# Patient Record
Sex: MALE | Race: WHITE | NOT HISPANIC OR LATINO
[De-identification: names, ages, dates, MRNs, and addresses within clinical notes are randomized per-mention and may not be internally consistent; named-entity substitution may affect disease eponyms.]

---

## 2017-07-17 PROBLEM — Z00.00 ENCOUNTER FOR PREVENTIVE HEALTH EXAMINATION: Status: ACTIVE | Noted: 2017-07-17

## 2018-12-02 ENCOUNTER — TRANSCRIPTION ENCOUNTER (OUTPATIENT)
Age: 78
End: 2018-12-02

## 2018-12-07 ENCOUNTER — TRANSCRIPTION ENCOUNTER (OUTPATIENT)
Age: 78
End: 2018-12-07

## 2019-04-02 ENCOUNTER — RX RENEWAL (OUTPATIENT)
Age: 79
End: 2019-04-02

## 2019-06-13 ENCOUNTER — OUTPATIENT (OUTPATIENT)
Dept: OUTPATIENT SERVICES | Facility: HOSPITAL | Age: 79
LOS: 1 days | Discharge: HOME | End: 2019-06-13
Payer: COMMERCIAL

## 2019-06-13 VITALS
OXYGEN SATURATION: 97 % | DIASTOLIC BLOOD PRESSURE: 80 MMHG | HEIGHT: 62 IN | RESPIRATION RATE: 17 BRPM | WEIGHT: 228.18 LBS | TEMPERATURE: 98 F | SYSTOLIC BLOOD PRESSURE: 130 MMHG | HEART RATE: 76 BPM

## 2019-06-13 DIAGNOSIS — Z01.818 ENCOUNTER FOR OTHER PREPROCEDURAL EXAMINATION: ICD-10-CM

## 2019-06-13 DIAGNOSIS — Z90.49 ACQUIRED ABSENCE OF OTHER SPECIFIED PARTS OF DIGESTIVE TRACT: Chronic | ICD-10-CM

## 2019-06-13 DIAGNOSIS — G47.30 SLEEP APNEA, UNSPECIFIED: ICD-10-CM

## 2019-06-13 DIAGNOSIS — I10 ESSENTIAL (PRIMARY) HYPERTENSION: ICD-10-CM

## 2019-06-13 DIAGNOSIS — M16.11 UNILATERAL PRIMARY OSTEOARTHRITIS, RIGHT HIP: ICD-10-CM

## 2019-06-13 DIAGNOSIS — Z96.642 PRESENCE OF LEFT ARTIFICIAL HIP JOINT: Chronic | ICD-10-CM

## 2019-06-13 LAB
ANION GAP SERPL CALC-SCNC: 7 MMOL/L — SIGNIFICANT CHANGE UP (ref 5–17)
APTT BLD: 28.4 SEC — SIGNIFICANT CHANGE UP (ref 27.5–36.3)
BASOPHILS # BLD AUTO: 0.06 K/UL — SIGNIFICANT CHANGE UP (ref 0–0.2)
BASOPHILS NFR BLD AUTO: 0.7 % — SIGNIFICANT CHANGE UP (ref 0–2)
BLD GP AB SCN SERPL QL: SIGNIFICANT CHANGE UP
BUN SERPL-MCNC: 19 MG/DL — SIGNIFICANT CHANGE UP (ref 7–23)
CALCIUM SERPL-MCNC: 9.4 MG/DL — SIGNIFICANT CHANGE UP (ref 8.5–10.1)
CHLORIDE SERPL-SCNC: 101 MMOL/L — SIGNIFICANT CHANGE UP (ref 96–108)
CO2 SERPL-SCNC: 31 MMOL/L — SIGNIFICANT CHANGE UP (ref 22–31)
CREAT SERPL-MCNC: 0.91 MG/DL — SIGNIFICANT CHANGE UP (ref 0.5–1.3)
EOSINOPHIL # BLD AUTO: 0.2 K/UL — SIGNIFICANT CHANGE UP (ref 0–0.5)
EOSINOPHIL NFR BLD AUTO: 2.5 % — SIGNIFICANT CHANGE UP (ref 0–6)
GLUCOSE SERPL-MCNC: 127 MG/DL — HIGH (ref 70–99)
HBA1C BLD-MCNC: 6.4 % — HIGH (ref 4–5.6)
HCT VFR BLD CALC: 44.5 % — SIGNIFICANT CHANGE UP (ref 39–50)
HGB BLD-MCNC: 14.1 G/DL — SIGNIFICANT CHANGE UP (ref 13–17)
IMM GRANULOCYTES NFR BLD AUTO: 0.4 % — SIGNIFICANT CHANGE UP (ref 0–1.5)
INR BLD: 1.03 RATIO — SIGNIFICANT CHANGE UP (ref 0.88–1.16)
LYMPHOCYTES # BLD AUTO: 1.46 K/UL — SIGNIFICANT CHANGE UP (ref 1–3.3)
LYMPHOCYTES # BLD AUTO: 18 % — SIGNIFICANT CHANGE UP (ref 13–44)
MCHC RBC-ENTMCNC: 26.2 PG — LOW (ref 27–34)
MCHC RBC-ENTMCNC: 31.7 GM/DL — LOW (ref 32–36)
MCV RBC AUTO: 82.7 FL — SIGNIFICANT CHANGE UP (ref 80–100)
MONOCYTES # BLD AUTO: 0.64 K/UL — SIGNIFICANT CHANGE UP (ref 0–0.9)
MONOCYTES NFR BLD AUTO: 7.9 % — SIGNIFICANT CHANGE UP (ref 2–14)
NEUTROPHILS # BLD AUTO: 5.73 K/UL — SIGNIFICANT CHANGE UP (ref 1.8–7.4)
NEUTROPHILS NFR BLD AUTO: 70.5 % — SIGNIFICANT CHANGE UP (ref 43–77)
NRBC # BLD: 0 /100 WBCS — SIGNIFICANT CHANGE UP (ref 0–0)
PLATELET # BLD AUTO: 277 K/UL — SIGNIFICANT CHANGE UP (ref 150–400)
POTASSIUM SERPL-MCNC: 4 MMOL/L — SIGNIFICANT CHANGE UP (ref 3.5–5.3)
POTASSIUM SERPL-SCNC: 4 MMOL/L — SIGNIFICANT CHANGE UP (ref 3.5–5.3)
PROTHROM AB SERPL-ACNC: 11.6 SEC — SIGNIFICANT CHANGE UP (ref 10–12.9)
RBC # BLD: 5.38 M/UL — SIGNIFICANT CHANGE UP (ref 4.2–5.8)
RBC # FLD: 15 % — HIGH (ref 10.3–14.5)
SODIUM SERPL-SCNC: 139 MMOL/L — SIGNIFICANT CHANGE UP (ref 135–145)
WBC # BLD: 8.12 K/UL — SIGNIFICANT CHANGE UP (ref 3.8–10.5)
WBC # FLD AUTO: 8.12 K/UL — SIGNIFICANT CHANGE UP (ref 3.8–10.5)

## 2019-06-13 PROCEDURE — 93010 ELECTROCARDIOGRAM REPORT: CPT

## 2019-06-13 RX ORDER — SODIUM CHLORIDE 9 MG/ML
3 INJECTION INTRAMUSCULAR; INTRAVENOUS; SUBCUTANEOUS EVERY 8 HOURS
Refills: 0 | Status: DISCONTINUED | OUTPATIENT
Start: 2019-06-26 | End: 2019-06-28

## 2019-06-13 NOTE — PHYSICAL THERAPY INITIAL EVALUATION ADULT - GAIT DEVIATIONS NOTED, PT EVAL
increased time in double stance/decreased step length/decreased catarina/decreased velocity of limb motion/decreased stride length/decreased weight-shifting ability

## 2019-06-13 NOTE — H&P PST ADULT - HISTORY OF PRESENT ILLNESS
79M pmh --- c/o right hip pain 2/2 unilateral primary osteoarthritis here for PST for scheduled Right total hip arthroplasty 79M pmh bladder Cancer (treated with sx), sleep apnea (on CPAP), achalasia, htn c/o right hip pain 2/2 unilateral primary osteoarthritis here for PST for scheduled Right total hip arthroplasty

## 2019-06-13 NOTE — H&P PST ADULT - NSICDXPROBLEM_GEN_ALL_CORE_FT
PROBLEM DIAGNOSES  Problem: Unilateral primary osteoarthritis, right hip  Assessment and Plan:   Right total hip arthroplasty  Pre-op instructions given by PA, patient verbalized understanding  Chlorhexidine wash instructions given   Pending: Medical Clearance    Problem: Sleep apnea  Assessment and Plan:   Using CPAP    Problem: HTN (hypertension)  Assessment and Plan:   Continue current regimen and medications.

## 2019-06-13 NOTE — PHYSICAL THERAPY INITIAL EVALUATION ADULT - CRITERIA FOR SKILLED THERAPEUTIC INTERVENTIONS
therapy frequency/risk reduction/prevention/anticipated discharge recommendation/rehab potential/impairments found/functional limitations in following categories/anticipated equipment needs at discharge

## 2019-06-13 NOTE — PHYSICAL THERAPY INITIAL EVALUATION ADULT - TINETTI BALANCE TEST, REHAB EVAL
Sitting Balance -1 /1 , Rises From Chair -1 /2, Attempts to rise - 2/2 , Immediate Standing Balance - 1/2,  Standing Balance - 1/2, Nudged - 2 /2, Eyes Closed -1 /1,  Turning 360 Deg -  1/2, Sitting down - 1/2, Balance Score -11 /16

## 2019-06-13 NOTE — H&P PST ADULT - ASSESSMENT
79M pmh bladder Cancer (treated with sx), sleep apnea (on CPAP), achalasia, htn c/o right hip pain 2/2 unilateral primary osteoarthritis here for PST for scheduled Right total hip arthroplasty  CAPRINI SCORE    AGE RELATED RISK FACTORS                                                       MOBILITY RELATED FACTORS  [ ] Age 41-60 years                                            (1 Point)                  [ ] Bed rest                                                        (1 Point)  [ ] Age: 61-74 years                                           (2 Points)                [ ] Plaster cast                                                   (2 Points)  [ ] Age= 75 years                                              (3 Points)                 [ ] Bed bound for more than 72 hours                   (2 Points)    DISEASE RELATED RISK FACTORS                                               GENDER SPECIFIC FACTORS  [ ] Edema in the lower extremities                       (1 Point)                  [ ] Pregnancy                                                     (1 Point)  [ ] Varicose veins                                               (1 Point)                  [ ] Post-partum < 6 weeks                                   (1 Point)             [ ] BMI > 25 Kg/m2                                            (1 Point)                  [ ] Hormonal therapy  or oral contraception            (1 Point)                 [ ] Sepsis (in the previous month)                        (1 Point)                  [ ] History of pregnancy complications  [ ] Pneumonia or serious lung disease                                               [ ] Unexplained or recurrent                       (1 Point)           (in the previous month)                               (1 Point)  [ ] Abnormal pulmonary function test                     (1 Point)                 SURGERY RELATED RISK FACTORS  [ ] Acute myocardial infarction                              (1 Point)                 [ ]  Section                                            (1 Point)  [ ] Congestive heart failure (in the previous month)  (1 Point)                 [ ] Minor surgery                                                 (1 Point)   [ ] Inflammatory bowel disease                             (1 Point)                 [ ] Arthroscopic surgery                                        (2 Points)  [ ] Central venous access                                    (2 Points)                [ ] General surgery lasting more than 45 minutes   (2 Points)       [ ] Stroke (in the previous month)                          (5 Points)               [ ] Elective arthroplasty                                        (5 Points)                                                                                                                                               HEMATOLOGY RELATED FACTORS                                                 TRAUMA RELATED RISK FACTORS  [ ] Prior episodes of VTE                                     (3 Points)                 [ ] Fracture of the hip, pelvis, or leg                       (5 Points)  [ ] Positive family history for VTE                         (3 Points)                 [ ] Acute spinal cord injury (in the previous month)  (5 Points)  [ ] Prothrombin 11204 A                                      (3 Points)                 [ ] Paralysis  (less than 1 month)                          (5 Points)  [ ] Factor V Leiden                                             (3 Points)                 [ ] Multiple Trauma within 1 month                         (5 Points)  [ ] Lupus anticoagulants                                     (3 Points)                                                           [ ] Anticardiolipin antibodies                                (3 Points)                                                       [ ] High homocysteine in the blood                      (3 Points)                                             [ ] Other congenital or acquired thrombophilia       (3 Points)                                                [ ] Heparin induced thrombocytopenia                  (3 Points)                                          Total Score [          ] 79M pmh bladder Cancer (treated with sx), sleep apnea (on CPAP), achalasia, htn c/o right hip pain 2/2 unilateral primary osteoarthritis here for PST for scheduled Right total hip arthroplasty  CAPRINI SCORE    AGE RELATED RISK FACTORS                                                       MOBILITY RELATED FACTORS  [ ] Age 41-60 years                                            (1 Point)                  [ ] Bed rest                                                        (1 Point)  [ ] Age: 61-74 years                                           (2 Points)                [ ] Plaster cast                                                   (2 Points)  [x ] Age= 75 years                                              (3 Points)                 [ ] Bed bound for more than 72 hours                   (2 Points)    DISEASE RELATED RISK FACTORS                                               GENDER SPECIFIC FACTORS  [ x] Edema in the lower extremities                       (1 Point)                  [ ] Pregnancy                                                     (1 Point)  [ ] Varicose veins                                               (1 Point)                  [ ] Post-partum < 6 weeks                                   (1 Point)             [x ] BMI > 25 Kg/m2                                            (1 Point)                  [ ] Hormonal therapy  or oral contraception            (1 Point)                 [ ] Sepsis (in the previous month)                        (1 Point)                  [ ] History of pregnancy complications  [ ] Pneumonia or serious lung disease                                               [ ] Unexplained or recurrent                       (1 Point)           (in the previous month)                               (1 Point)  [ ] Abnormal pulmonary function test                     (1 Point)                 SURGERY RELATED RISK FACTORS  [ ] Acute myocardial infarction                              (1 Point)                 [ ]  Section                                            (1 Point)  [ ] Congestive heart failure (in the previous month)  (1 Point)                 [ ] Minor surgery                                                 (1 Point)   [ ] Inflammatory bowel disease                             (1 Point)                 [ ] Arthroscopic surgery                                        (2 Points)  [ ] Central venous access                                    (2 Points)                [ ] General surgery lasting more than 45 minutes   (2 Points)       [ ] Stroke (in the previous month)                          (5 Points)               [ x] Elective arthroplasty                                        (5 Points)                                                                                                                                               HEMATOLOGY RELATED FACTORS                                                 TRAUMA RELATED RISK FACTORS  [ ] Prior episodes of VTE                                     (3 Points)                 [ ] Fracture of the hip, pelvis, or leg                       (5 Points)  [ ] Positive family history for VTE                         (3 Points)                 [ ] Acute spinal cord injury (in the previous month)  (5 Points)  [ ] Prothrombin 20942 A                                      (3 Points)                 [ ] Paralysis  (less than 1 month)                          (5 Points)  [ ] Factor V Leiden                                             (3 Points)                 [ ] Multiple Trauma within 1 month                         (5 Points)  [ ] Lupus anticoagulants                                     (3 Points)                                                           [ ] Anticardiolipin antibodies                                (3 Points)                                                       [ ] High homocysteine in the blood                      (3 Points)                                             [ ] Other congenital or acquired thrombophilia       (3 Points)                                                [ ] Heparin induced thrombocytopenia                  (3 Points)                                          Total Score [   10       ]

## 2019-06-13 NOTE — PHYSICAL THERAPY INITIAL EVALUATION ADULT - IMPAIRMENTS FOUND, PT EVAL
posture/aerobic capacity/endurance/gait, locomotion, and balance/muscle strength/ergonomics and body mechanics

## 2019-06-13 NOTE — H&P PST ADULT - NSICDXPASTMEDICALHX_GEN_ALL_CORE_FT
PAST MEDICAL HISTORY:  Achalasia     Bladder cancer treated surgically    GERD (gastroesophageal reflux disease)     History of restless legs syndrome     HTN (hypertension)     S/P right cataract extraction     Sleep apnea on CPAP

## 2019-06-13 NOTE — PHYSICAL THERAPY INITIAL EVALUATION ADULT - TINETTI GAIT TEST, REHAB EVAL
Indication of gait -0/1,   Step Length and height -1/2,   Foot Clearance -2/2,   Step Symmetry -0 /1,  Step Continuity -0/1,   Path -1/ 2,   Trunk -1/2,   Walking Time -1/1,   Total Score -6 /12

## 2019-06-13 NOTE — PHYSICAL THERAPY INITIAL EVALUATION ADULT - ADDITIONAL COMMENTS
Pt lives alone in an apt c 2 low steps, sarita rails,  far apart and unable to be reached simultaneously, at the entry. There is no stairs to negotiate in the apt unit.  Pt has a tub/shower  Pt has a tub/shower combo and regular toilet seat in BR. Pt is R handed and drives., fixed shower head, and regular toilet seat in BR. Pt is L handed and drives. R hip pain increases with activities and usually is 8/10. Pt takes over the counter pain meds as needed.  Pt's sonJulio is near by and  will be available to assist pt in post -op care upon discharge home. Pt has a straight cane and it's in good condition.

## 2019-06-14 LAB
MRSA PCR RESULT.: SIGNIFICANT CHANGE UP
S AUREUS DNA NOSE QL NAA+PROBE: SIGNIFICANT CHANGE UP

## 2019-06-17 ENCOUNTER — APPOINTMENT (OUTPATIENT)
Dept: INTERNAL MEDICINE | Facility: CLINIC | Age: 79
End: 2019-06-17
Payer: MEDICARE

## 2019-06-17 VITALS
RESPIRATION RATE: 16 BRPM | WEIGHT: 220 LBS | SYSTOLIC BLOOD PRESSURE: 130 MMHG | DIASTOLIC BLOOD PRESSURE: 70 MMHG | BODY MASS INDEX: 40.48 KG/M2 | HEIGHT: 62 IN

## 2019-06-17 DIAGNOSIS — R76.12 NONSPECIFIC REACTION TO CELL MEDIATED IMMUNITY MEASUREMENT OF GAMMA INTERFERON ANTIGEN RESPONSE W/OUT ACTIVE TUBERCULOSIS: ICD-10-CM

## 2019-06-17 DIAGNOSIS — K22.0 ACHALASIA OF CARDIA: ICD-10-CM

## 2019-06-17 PROCEDURE — 99214 OFFICE O/P EST MOD 30 MIN: CPT

## 2019-06-17 RX ORDER — PREGABALIN 150 MG/1
150 CAPSULE ORAL
Refills: 0 | Status: ACTIVE | COMMUNITY

## 2019-06-17 NOTE — PHYSICAL EXAM
[No Acute Distress] : no acute distress [Supple] : supple [No Respiratory Distress] : no respiratory distress  [Clear to Auscultation] : lungs were clear to auscultation bilaterally [Normal Rate] : normal rate  [Regular Rhythm] : with a regular rhythm [Normal S1, S2] : normal S1 and S2 [Non Tender] : non-tender [Soft] : abdomen soft [No Focal Deficits] : no focal deficits

## 2019-06-17 NOTE — HISTORY OF PRESENT ILLNESS
[No Pertinent Cardiac History] : no history of aortic stenosis, atrial fibrillation, coronary artery disease, recent myocardial infarction, or implantable device/pacemaker [Sleep Apnea] : sleep apnea [No Adverse Anesthesia Reaction] : no adverse anesthesia reaction in self or family member [Chronic Anticoagulation] : no chronic anticoagulation [Chronic Kidney Disease] : no chronic kidney disease [Diabetes] : no diabetes [(Patient denies any chest pain, claudication, dyspnea on exertion, orthopnea, palpitations or syncope)] : Patient denies any chest pain, claudication, dyspnea on exertion, orthopnea, palpitations or syncope [Moderate (4-6 METs)] : Moderate (4-6 METs) [FreeTextEntry1] : right total hip [FreeTextEntry2] : 6/26/19 [FreeTextEntry3] : deep [FreeTextEntry7] : cardiac w/u 2009

## 2019-06-25 ENCOUNTER — TRANSCRIPTION ENCOUNTER (OUTPATIENT)
Age: 79
End: 2019-06-25

## 2019-06-26 ENCOUNTER — TRANSCRIPTION ENCOUNTER (OUTPATIENT)
Age: 79
End: 2019-06-26

## 2019-06-26 ENCOUNTER — INPATIENT (INPATIENT)
Facility: HOSPITAL | Age: 79
LOS: 1 days | Discharge: HOME HEALTH | End: 2019-06-28
Attending: ORTHOPAEDIC SURGERY | Admitting: ORTHOPAEDIC SURGERY
Payer: COMMERCIAL

## 2019-06-26 ENCOUNTER — RESULT REVIEW (OUTPATIENT)
Age: 79
End: 2019-06-26

## 2019-06-26 VITALS
HEART RATE: 71 BPM | TEMPERATURE: 97 F | DIASTOLIC BLOOD PRESSURE: 73 MMHG | OXYGEN SATURATION: 95 % | WEIGHT: 227.08 LBS | RESPIRATION RATE: 20 BRPM | SYSTOLIC BLOOD PRESSURE: 161 MMHG | HEIGHT: 62 IN

## 2019-06-26 DIAGNOSIS — Z96.642 PRESENCE OF LEFT ARTIFICIAL HIP JOINT: Chronic | ICD-10-CM

## 2019-06-26 DIAGNOSIS — Z98.890 OTHER SPECIFIED POSTPROCEDURAL STATES: Chronic | ICD-10-CM

## 2019-06-26 DIAGNOSIS — Z90.49 ACQUIRED ABSENCE OF OTHER SPECIFIED PARTS OF DIGESTIVE TRACT: Chronic | ICD-10-CM

## 2019-06-26 LAB
ANION GAP SERPL CALC-SCNC: 6 MMOL/L — SIGNIFICANT CHANGE UP (ref 5–17)
BLD GP AB SCN SERPL QL: SIGNIFICANT CHANGE UP
BUN SERPL-MCNC: 21 MG/DL — SIGNIFICANT CHANGE UP (ref 7–23)
CALCIUM SERPL-MCNC: 8.2 MG/DL — LOW (ref 8.5–10.1)
CHLORIDE SERPL-SCNC: 107 MMOL/L — SIGNIFICANT CHANGE UP (ref 96–108)
CO2 SERPL-SCNC: 29 MMOL/L — SIGNIFICANT CHANGE UP (ref 22–31)
CREAT SERPL-MCNC: 0.95 MG/DL — SIGNIFICANT CHANGE UP (ref 0.5–1.3)
GLUCOSE SERPL-MCNC: 122 MG/DL — HIGH (ref 70–99)
HCT VFR BLD CALC: 38.4 % — LOW (ref 39–50)
HGB BLD-MCNC: 12.1 G/DL — LOW (ref 13–17)
MCHC RBC-ENTMCNC: 26.5 PG — LOW (ref 27–34)
MCHC RBC-ENTMCNC: 31.5 GM/DL — LOW (ref 32–36)
MCV RBC AUTO: 84 FL — SIGNIFICANT CHANGE UP (ref 80–100)
NRBC # BLD: 0 /100 WBCS — SIGNIFICANT CHANGE UP (ref 0–0)
PLATELET # BLD AUTO: 215 K/UL — SIGNIFICANT CHANGE UP (ref 150–400)
POTASSIUM SERPL-MCNC: 4.3 MMOL/L — SIGNIFICANT CHANGE UP (ref 3.5–5.3)
POTASSIUM SERPL-SCNC: 4.3 MMOL/L — SIGNIFICANT CHANGE UP (ref 3.5–5.3)
RBC # BLD: 4.57 M/UL — SIGNIFICANT CHANGE UP (ref 4.2–5.8)
RBC # FLD: 15.1 % — HIGH (ref 10.3–14.5)
SODIUM SERPL-SCNC: 142 MMOL/L — SIGNIFICANT CHANGE UP (ref 135–145)
WBC # BLD: 9.08 K/UL — SIGNIFICANT CHANGE UP (ref 3.8–10.5)
WBC # FLD AUTO: 9.08 K/UL — SIGNIFICANT CHANGE UP (ref 3.8–10.5)

## 2019-06-26 PROCEDURE — 88311 DECALCIFY TISSUE: CPT | Mod: 26

## 2019-06-26 PROCEDURE — 88305 TISSUE EXAM BY PATHOLOGIST: CPT | Mod: 26

## 2019-06-26 PROCEDURE — 72170 X-RAY EXAM OF PELVIS: CPT | Mod: 26

## 2019-06-26 RX ORDER — SODIUM CHLORIDE 9 MG/ML
1000 INJECTION, SOLUTION INTRAVENOUS
Refills: 0 | Status: DISCONTINUED | OUTPATIENT
Start: 2019-06-26 | End: 2019-06-26

## 2019-06-26 RX ORDER — ONDANSETRON 8 MG/1
4 TABLET, FILM COATED ORAL EVERY 6 HOURS
Refills: 0 | Status: DISCONTINUED | OUTPATIENT
Start: 2019-06-26 | End: 2019-06-28

## 2019-06-26 RX ORDER — ROPINIROLE 8 MG/1
2 TABLET, FILM COATED, EXTENDED RELEASE ORAL
Refills: 0 | Status: DISCONTINUED | OUTPATIENT
Start: 2019-06-26 | End: 2019-06-28

## 2019-06-26 RX ORDER — PANTOPRAZOLE SODIUM 20 MG/1
40 TABLET, DELAYED RELEASE ORAL
Refills: 0 | Status: DISCONTINUED | OUTPATIENT
Start: 2019-06-26 | End: 2019-06-28

## 2019-06-26 RX ORDER — OXYBUTYNIN CHLORIDE 5 MG
5 TABLET ORAL
Refills: 0 | Status: DISCONTINUED | OUTPATIENT
Start: 2019-06-26 | End: 2019-06-28

## 2019-06-26 RX ORDER — SODIUM CHLORIDE 9 MG/ML
1000 INJECTION, SOLUTION INTRAVENOUS
Refills: 0 | Status: DISCONTINUED | OUTPATIENT
Start: 2019-06-26 | End: 2019-06-27

## 2019-06-26 RX ORDER — POLYETHYLENE GLYCOL 3350 17 G/17G
17 POWDER, FOR SOLUTION ORAL DAILY
Refills: 0 | Status: DISCONTINUED | OUTPATIENT
Start: 2019-06-26 | End: 2019-06-28

## 2019-06-26 RX ORDER — ASCORBIC ACID 60 MG
500 TABLET,CHEWABLE ORAL
Refills: 0 | Status: DISCONTINUED | OUTPATIENT
Start: 2019-06-26 | End: 2019-06-28

## 2019-06-26 RX ORDER — OXYCODONE HYDROCHLORIDE 5 MG/1
5 TABLET ORAL EVERY 4 HOURS
Refills: 0 | Status: DISCONTINUED | OUTPATIENT
Start: 2019-06-26 | End: 2019-06-27

## 2019-06-26 RX ORDER — HYDROMORPHONE HYDROCHLORIDE 2 MG/ML
1 INJECTION INTRAMUSCULAR; INTRAVENOUS; SUBCUTANEOUS EVERY 4 HOURS
Refills: 0 | Status: DISCONTINUED | OUTPATIENT
Start: 2019-06-26 | End: 2019-06-28

## 2019-06-26 RX ORDER — FERROUS SULFATE 325(65) MG
325 TABLET ORAL
Refills: 0 | Status: DISCONTINUED | OUTPATIENT
Start: 2019-06-26 | End: 2019-06-28

## 2019-06-26 RX ORDER — CELECOXIB 200 MG/1
200 CAPSULE ORAL
Refills: 0 | Status: DISCONTINUED | OUTPATIENT
Start: 2019-06-27 | End: 2019-06-28

## 2019-06-26 RX ORDER — ACETAMINOPHEN 500 MG
650 TABLET ORAL ONCE
Refills: 0 | Status: COMPLETED | OUTPATIENT
Start: 2019-06-26 | End: 2019-06-26

## 2019-06-26 RX ORDER — CEFAZOLIN SODIUM 1 G
2000 VIAL (EA) INJECTION EVERY 8 HOURS
Refills: 0 | Status: COMPLETED | OUTPATIENT
Start: 2019-06-26 | End: 2019-06-26

## 2019-06-26 RX ORDER — ACETAMINOPHEN 500 MG
975 TABLET ORAL EVERY 8 HOURS
Refills: 0 | Status: DISCONTINUED | OUTPATIENT
Start: 2019-06-26 | End: 2019-06-28

## 2019-06-26 RX ORDER — SENNA PLUS 8.6 MG/1
2 TABLET ORAL AT BEDTIME
Refills: 0 | Status: DISCONTINUED | OUTPATIENT
Start: 2019-06-26 | End: 2019-06-28

## 2019-06-26 RX ORDER — ACETAMINOPHEN 500 MG
1000 TABLET ORAL ONCE
Refills: 0 | Status: COMPLETED | OUTPATIENT
Start: 2019-06-26 | End: 2019-06-26

## 2019-06-26 RX ORDER — TOLTERODINE TARTRATE 1 MG/1
1 TABLET, FILM COATED ORAL
Qty: 0 | Refills: 0 | DISCHARGE

## 2019-06-26 RX ORDER — MULTIVIT-MIN/FERROUS GLUCONATE 9 MG/15 ML
1 LIQUID (ML) ORAL
Qty: 0 | Refills: 0 | DISCHARGE

## 2019-06-26 RX ORDER — OXYCODONE HYDROCHLORIDE 5 MG/1
5 TABLET ORAL EVERY 4 HOURS
Refills: 0 | Status: DISCONTINUED | OUTPATIENT
Start: 2019-06-26 | End: 2019-06-28

## 2019-06-26 RX ORDER — HYDROMORPHONE HYDROCHLORIDE 2 MG/ML
0.5 INJECTION INTRAMUSCULAR; INTRAVENOUS; SUBCUTANEOUS
Refills: 0 | Status: DISCONTINUED | OUTPATIENT
Start: 2019-06-26 | End: 2019-06-26

## 2019-06-26 RX ORDER — DEXAMETHASONE 0.5 MG/5ML
10 ELIXIR ORAL ONCE
Refills: 0 | Status: COMPLETED | OUTPATIENT
Start: 2019-06-27 | End: 2019-06-27

## 2019-06-26 RX ORDER — OXYCODONE HYDROCHLORIDE 5 MG/1
10 TABLET ORAL EVERY 4 HOURS
Refills: 0 | Status: DISCONTINUED | OUTPATIENT
Start: 2019-06-26 | End: 2019-06-28

## 2019-06-26 RX ORDER — FOLIC ACID 0.8 MG
1 TABLET ORAL DAILY
Refills: 0 | Status: DISCONTINUED | OUTPATIENT
Start: 2019-06-26 | End: 2019-06-28

## 2019-06-26 RX ORDER — AMLODIPINE BESYLATE 2.5 MG/1
1 TABLET ORAL
Qty: 0 | Refills: 0 | DISCHARGE

## 2019-06-26 RX ORDER — ROPINIROLE 8 MG/1
2 TABLET, FILM COATED, EXTENDED RELEASE ORAL
Qty: 0 | Refills: 0 | DISCHARGE

## 2019-06-26 RX ORDER — DOCUSATE SODIUM 100 MG
100 CAPSULE ORAL THREE TIMES A DAY
Refills: 0 | Status: DISCONTINUED | OUTPATIENT
Start: 2019-06-26 | End: 2019-06-28

## 2019-06-26 RX ORDER — AMLODIPINE BESYLATE 2.5 MG/1
5 TABLET ORAL DAILY
Refills: 0 | Status: DISCONTINUED | OUTPATIENT
Start: 2019-06-26 | End: 2019-06-28

## 2019-06-26 RX ORDER — CELECOXIB 200 MG/1
200 CAPSULE ORAL ONCE
Refills: 0 | Status: COMPLETED | OUTPATIENT
Start: 2019-06-26 | End: 2019-06-26

## 2019-06-26 RX ORDER — ASPIRIN/CALCIUM CARB/MAGNESIUM 324 MG
325 TABLET ORAL
Refills: 0 | Status: DISCONTINUED | OUTPATIENT
Start: 2019-06-27 | End: 2019-06-28

## 2019-06-26 RX ORDER — RANITIDINE HYDROCHLORIDE 150 MG/1
0 TABLET, FILM COATED ORAL
Qty: 0 | Refills: 0 | DISCHARGE

## 2019-06-26 RX ORDER — TRANEXAMIC ACID 100 MG/ML
1000 INJECTION, SOLUTION INTRAVENOUS ONCE
Refills: 0 | Status: COMPLETED | OUTPATIENT
Start: 2019-06-26 | End: 2019-06-26

## 2019-06-26 RX ADMIN — OXYCODONE HYDROCHLORIDE 5 MILLIGRAM(S): 5 TABLET ORAL at 23:10

## 2019-06-26 RX ADMIN — Medication 975 MILLIGRAM(S): at 13:08

## 2019-06-26 RX ADMIN — OXYCODONE HYDROCHLORIDE 10 MILLIGRAM(S): 5 TABLET ORAL at 12:41

## 2019-06-26 RX ADMIN — Medication 650 MILLIGRAM(S): at 07:11

## 2019-06-26 RX ADMIN — SODIUM CHLORIDE 150 MILLILITER(S): 9 INJECTION, SOLUTION INTRAVENOUS at 12:45

## 2019-06-26 RX ADMIN — TRANEXAMIC ACID 220 MILLIGRAM(S): 100 INJECTION, SOLUTION INTRAVENOUS at 10:20

## 2019-06-26 RX ADMIN — SODIUM CHLORIDE 75 MILLILITER(S): 9 INJECTION, SOLUTION INTRAVENOUS at 10:20

## 2019-06-26 RX ADMIN — SENNA PLUS 2 TABLET(S): 8.6 TABLET ORAL at 22:06

## 2019-06-26 RX ADMIN — OXYCODONE HYDROCHLORIDE 5 MILLIGRAM(S): 5 TABLET ORAL at 19:10

## 2019-06-26 RX ADMIN — SODIUM CHLORIDE 3 MILLILITER(S): 9 INJECTION INTRAMUSCULAR; INTRAVENOUS; SUBCUTANEOUS at 21:13

## 2019-06-26 RX ADMIN — Medication 975 MILLIGRAM(S): at 22:05

## 2019-06-26 RX ADMIN — POLYETHYLENE GLYCOL 3350 17 GRAM(S): 17 POWDER, FOR SOLUTION ORAL at 12:53

## 2019-06-26 RX ADMIN — ROPINIROLE 2 MILLIGRAM(S): 8 TABLET, FILM COATED, EXTENDED RELEASE ORAL at 20:35

## 2019-06-26 RX ADMIN — SODIUM CHLORIDE 3 MILLILITER(S): 9 INJECTION INTRAMUSCULAR; INTRAVENOUS; SUBCUTANEOUS at 13:55

## 2019-06-26 RX ADMIN — Medication 100 MILLIGRAM(S): at 23:12

## 2019-06-26 RX ADMIN — OXYCODONE HYDROCHLORIDE 5 MILLIGRAM(S): 5 TABLET ORAL at 14:06

## 2019-06-26 RX ADMIN — Medication 500 MILLIGRAM(S): at 18:49

## 2019-06-26 RX ADMIN — OXYCODONE HYDROCHLORIDE 10 MILLIGRAM(S): 5 TABLET ORAL at 12:12

## 2019-06-26 RX ADMIN — Medication 1 MILLIGRAM(S): at 13:08

## 2019-06-26 RX ADMIN — Medication 100 MILLIGRAM(S): at 15:20

## 2019-06-26 RX ADMIN — OXYCODONE HYDROCHLORIDE 10 MILLIGRAM(S): 5 TABLET ORAL at 23:10

## 2019-06-26 RX ADMIN — Medication 975 MILLIGRAM(S): at 13:55

## 2019-06-26 RX ADMIN — OXYCODONE HYDROCHLORIDE 5 MILLIGRAM(S): 5 TABLET ORAL at 18:50

## 2019-06-26 RX ADMIN — Medication 1 TABLET(S): at 12:53

## 2019-06-26 RX ADMIN — OXYCODONE HYDROCHLORIDE 5 MILLIGRAM(S): 5 TABLET ORAL at 15:17

## 2019-06-26 RX ADMIN — Medication 100 MILLIGRAM(S): at 22:06

## 2019-06-26 RX ADMIN — SODIUM CHLORIDE 150 MILLILITER(S): 9 INJECTION, SOLUTION INTRAVENOUS at 22:07

## 2019-06-26 RX ADMIN — Medication 150 MILLIGRAM(S): at 23:10

## 2019-06-26 RX ADMIN — Medication 5 MILLIGRAM(S): at 20:34

## 2019-06-26 RX ADMIN — Medication 325 MILLIGRAM(S): at 13:08

## 2019-06-26 RX ADMIN — Medication 100 MILLIGRAM(S): at 13:08

## 2019-06-26 RX ADMIN — Medication 325 MILLIGRAM(S): at 18:49

## 2019-06-26 RX ADMIN — CELECOXIB 200 MILLIGRAM(S): 200 CAPSULE ORAL at 07:11

## 2019-06-26 RX ADMIN — Medication 150 MILLIGRAM(S): at 12:54

## 2019-06-26 RX ADMIN — Medication 150 MILLIGRAM(S): at 18:50

## 2019-06-26 RX ADMIN — Medication 975 MILLIGRAM(S): at 23:05

## 2019-06-26 NOTE — CONSULT NOTE ADULT - SUBJECTIVE AND OBJECTIVE BOX
EITAN JORDAN is a 79y Male s/p RIGHT TOTAL HIP ARTHROPLASTY    w/ h/o Neuropathy  S/P right cataract extraction  History of restless legs syndrome  Bladder cancer  Achalasia  GERD (gastroesophageal reflux disease)  Sleep apnea  HTN (hypertension)    denies any chest pain shortness of breath palpitation dizziness lightheadedness nausea vomiting fever or chills    S/P spinal surgery  S/P appendectomy  H/O total hip arthroplasty, left      SH: doesnot smoke or drink at this time    No Known Allergies    acetaminophen   Tablet .. 975 milliGRAM(s) Oral every 8 hours  aluminum hydroxide/magnesium hydroxide/simethicone Suspension 30 milliLiter(s) Oral four times a day PRN  amLODIPine   Tablet 5 milliGRAM(s) Oral daily  ascorbic acid 500 milliGRAM(s) Oral two times a day  ceFAZolin   IVPB 2000 milliGRAM(s) IV Intermittent every 8 hours  docusate sodium 100 milliGRAM(s) Oral three times a day  ferrous    sulfate 325 milliGRAM(s) Oral three times a day with meals  folic acid 1 milliGRAM(s) Oral daily  HYDROmorphone  Injectable 1 milliGRAM(s) IV Push every 4 hours PRN  lactated ringers. 1000 milliLiter(s) IV Continuous <Continuous>  multivitamin 1 Tablet(s) Oral daily  ondansetron Injectable 4 milliGRAM(s) IV Push every 6 hours PRN  oxybutynin 5 milliGRAM(s) Oral two times a day  oxyCODONE    IR 5 milliGRAM(s) Oral every 4 hours PRN  oxyCODONE    IR 10 milliGRAM(s) Oral every 4 hours PRN  oxyCODONE    IR 5 milliGRAM(s) Oral every 4 hours  pantoprazole    Tablet 40 milliGRAM(s) Oral before breakfast  polyethylene glycol 3350 17 Gram(s) Oral daily  pregabalin 150 milliGRAM(s) Oral four times a day  rOPINIRole 2 milliGRAM(s) Oral two times a day  senna 2 Tablet(s) Oral at bedtime PRN  sodium chloride 0.9% lock flush 3 milliLiter(s) IV Push every 8 hours    T(C): 36.4 (06-26-19 @ 14:05), Max: 36.4 (06-26-19 @ 11:30)  HR: 68 (06-26-19 @ 16:15) (55 - 74)  BP: 125/78 (06-26-19 @ 16:15) (95/53 - 161/73)  RR: 17 (06-26-19 @ 16:15) (14 - 20)  SpO2: 97% (06-26-19 @ 16:15) (90% - 98%)  HEENT unremarkable  neck no JVD or bruit  heart normal S1 S2 RRR no gallops or rubs  chest clear to auscultation  abd sof nontender non distended +bs  ext no calf tenderness    A/P   DVT PX  pain control  bowel regimen   wound care as per ortho  GI PX  antiemetics prn  incentive spirometer  bp control

## 2019-06-26 NOTE — DISCHARGE NOTE NURSING/CASE MANAGEMENT/SOCIAL WORK - NSSCTYPOFSERV_GEN_ALL_CORE
Skilled Nursing, Physical Therapy, and Occupational Therapy evaluations will be provided Skilled Nursing, Physical Therapy, Home Health Aide, and Occupational Therapy evaluations will be provided

## 2019-06-26 NOTE — DISCHARGE NOTE NURSING/CASE MANAGEMENT/SOCIAL WORK - NSDCDPATPORTLINK_GEN_ALL_CORE
You can access the [facility] Patient Portal, offered by [facility], by registering with the following website: [email or web address]

## 2019-06-26 NOTE — DISCHARGE NOTE NURSING/CASE MANAGEMENT/SOCIAL WORK - NSDCVIVACCINE_GEN_ALL_CORE_FT
Assessment/Plan:    Esophageal reflux  Possible cause of chest discomfort as well as nighttime cough  Continue pantoprazole  Recommend raising the head of the bed 5-6 inches  Recheck 3-4 weeks if not improved    Allergic rhinitis  Some worsening springtime symptoms  Change Zyrtec dosing to q h s     Patient will call in 2-3 weeks with follow-up    Hypertension  Improved compared to her stress test   Continue present medications  Recheck 6 months    Mild episode of recurrent major depressive disorder (HCC)  Persistent symptoms of slightly improved compared to previous exam   Continue present treatment  Continue to monitor  Recheck 3 months    Palpitations  No significant dysrhythmias appreciated on Holter  Continue to observe  Recheck 3 months-earlier if needed       Diagnoses and all orders for this visit:    Chest pain, unspecified type  Comments:  Cardiac testing negative  ?  GERD related  Treat as below  Recheck 3 months-earlier if needed    Gastroesophageal reflux disease, esophagitis presence not specified    Chronic allergic rhinitis, unspecified seasonality, unspecified trigger    Essential hypertension    Mild episode of recurrent major depressive disorder (HCC)    Palpitations          Subjective:      Patient ID: Svetlana Gunn is a 60 y o  female  F/u multiple complaints  - pt still having some chest discomfort  Stress echo was negative at 8 METs activity  No symptoms with exertion  Holter showed PACs  BP was elevated at time of test   Symptoms seem to be worse when she lays flat  Patient is compliant with her pantoprazole but does not always watch her diet  Patient is still smoking   - higher dose of Lexapro seems to be helping with mood  Still has some labile mood though this is much better  Patient denies suicidality  PHQ-9 done  PHQ-9 done  - still has some morning swelling that improves as the day goes one  Patient does have persistent dry mouth    Previous workup for Sjogren's disease as well as other inflammatory disorders has been negative   ,- still struggling with dry mouth  Patient is still getting used to her new dentures and finds that her diet slowly improving  -  pt feels better than at last visit  The following portions of the patient's history were reviewed and updated as appropriate:   She  has a past medical history of Acid reflux; Fibromyalgia; Hypertension; and Seasonal allergies  She   Patient Active Problem List    Diagnosis Date Noted    Edema 07/13/2017    Lumbosacral radiculopathy at L5 01/06/2017    [facility]/30/2016    Hyperlipidemia 12/08/2015    Cataract 08/22/2014    Palpitations 08/05/2014    Chronic obstructive pulmonary disease ([facility] ) 04/17/2013    Esophageal reflux 04/17/2013    Pulmonary nodule seen on imaging study 04/17/2013    Hypertension 03/22/2013    Mild episode of recurrent major depressive disorder ([facility] ) 03/14/2013    Fibromyalgia 03/14/2013    Degeneration of intervertebral disc 03/14/2013    Anxiety 02/21/2013    Allergic rhinitis 12/20/2012     She  reports that she has been smoking  She has a 100 00 pack-year smoking history  She does not have any smokeless tobacco history on file  She reports that she does not drink alcohol or use drugs    Current Outpatient Prescriptions   Medication Sig Dispense Refill    acetaminophen (TYLENOL 8 HOUR ARTHRITIS PAIN) 650 mg CR tablet Take by mouth      ALPRAZolam (XANAX) 0 25 mg tablet Take 1 tablet by mouth 3 (three) times a day      cetirizine (ZyrTEC) 10 mg tablet Take 10 mg by mouth daily  3    escitalopram (LEXAPRO) 20 mg tablet Take 1 tablet (20 mg total) by mouth daily 30 tablet 5    fluticasone (FLONASE) 50 mcg/act nasal spray 2 sprays into each nostril daily 16 g 3    lisinopril (ZESTRIL) 20 mg tablet Take 1 tablet by mouth daily      minoxidil (ROGAINE) 2 % external solution Apply 1 application topically      nystatin (MYCOSTATIN) 100,000 units/mL suspension Apply to the mouth or throat      pantoprazole (PROTONIX) 40 mg tablet Take 1 tablet (40 mg total) by mouth daily 30 tablet 5    ranitidine (ZANTAC) 150 mg tablet Take 150 mg by mouth 2 (two) times a day  5     No current facility-administered medications for this visit  She is allergic to miconazole       Review of Systems   Constitutional: Negative for activity change, appetite change, chills, diaphoresis, fatigue and fever  HENT: Positive for congestion  Negative for dental problem, ear discharge, ear pain, mouth sores, nosebleeds, postnasal drip, rhinorrhea, sinus pain, sinus pressure, sore throat, tinnitus and trouble swallowing  Eyes: Negative  Respiratory: Negative  Cardiovascular: Positive for chest pain  Negative for palpitations and leg swelling  Gastrointestinal: Negative for abdominal distention, abdominal pain, anal bleeding, blood in stool, constipation, diarrhea and nausea  Endocrine: Negative  Genitourinary: Negative  Musculoskeletal: Positive for arthralgias, back pain and joint swelling  Negative for myalgias  Skin: Negative  Allergic/Immunologic: Negative  Neurological: Negative for dizziness, tremors, syncope, facial asymmetry, weakness, light-headedness and headaches  Hematological: Negative  Psychiatric/Behavioral: Positive for dysphoric mood and sleep disturbance  Negative for behavioral problems, decreased concentration and suicidal ideas  The patient is nervous/anxious  Objective:      /82   Pulse 76   Temp 98 1 °F (36 7 °C)   Resp 16   Ht 5' 4" (1 626 m)   Wt 95 kg (209 lb 6 4 oz)   BMI 35 94 kg/m²          Physical Exam   Constitutional: She is oriented to person, place, and time  She appears well-developed and well-nourished  No distress  HENT:   Head: Normocephalic and atraumatic     Right Ear: External ear normal    Left Ear: External ear normal    Nose: Nose normal    Mouth/Throat: Oropharynx is clear and moist    Eyes: Conjunctivae and EOM are normal  Pupils are equal, round, and reactive to light  Neck: Normal range of motion  Neck supple  No JVD present  No thyromegaly present  Cardiovascular: Normal rate, regular rhythm, normal heart sounds and intact distal pulses  No murmur heard  Pulmonary/Chest: Effort normal and breath sounds normal  No respiratory distress  She has no wheezes  She has no rales  Abdominal: Soft  Bowel sounds are normal  She exhibits no distension and no mass  There is no guarding  Musculoskeletal: She exhibits edema (trace in lower extremities)  She exhibits no deformity  Lymphadenopathy:     She has no cervical adenopathy  Neurological: She is alert and oriented to person, place, and time  No cranial nerve deficit  She exhibits normal muscle tone  Coordination normal    Skin: Skin is warm  Psychiatric: Her behavior is normal  Judgment and thought content normal    PHQ-9 = 8 (mild depression)   Vitals reviewed  No Vaccines Administered.

## 2019-06-26 NOTE — DISCHARGE NOTE PROVIDER - HOSPITAL COURSE
79yMale with history of Right Hip Pain presenting for Right SATINDER by Dr. Almonte on 6/26/19. Risk and benefits of surgery were explained to the patient. The patient understood and agreed to proceed with surgery. Patient underwent the procedure with no intraoperative complications. Pt was brought in stable condition to the PACU. Once stable in PACU, pt was brought to the floor. During hospital stay pt was followed by Medicine, during this admission. Pt had an uneventful hospital course. Pt is stable for discharge to [rehab/home] on POD#[ ]                                                                                                                                                                                                                                             /U 79yMale with history of Right Hip Pain presenting for Right SATINDER by Dr. Almonte on 6/26/19. Risk and benefits of surgery were explained to the patient. The patient understood and agreed to proceed with surgery. Patient underwent the procedure with no intraoperative complications. Pt was brought in stable condition to the PACU. Once stable in PACU, pt was brought to the floor. During hospital stay pt was followed by Medicine, during this admission. Pt had an uneventful hospital course. Pt is stable for discharge to home on POD#2                                                                                                                                                                                                                                         /

## 2019-06-26 NOTE — OCCUPATIONAL THERAPY INITIAL EVALUATION ADULT - ADDITIONAL COMMENTS
Pre op assessment confirmed- Pt lives alone in an apt c 2 low steps, sarita rails,  far apart and unable to be reached simultaneously, at the entry. There is no stairs to negotiate in the apt unit.  Pt has a tub/shower  Pt has a tub/shower combo and regular toilet seat in BR. Pt is R handed and drives., fixed shower head, and regular toilet seat in BR. Pt is L handed and drives. R hip pain increases with activities and usually is 8/10. Pt takes over the counter pain meds as needed.  Pt's son, Julio is near by and  will be available to assist pt in post -op care upon discharge home. Pt has a straight cane and it's in good condition.

## 2019-06-26 NOTE — PHYSICAL THERAPY INITIAL EVALUATION ADULT - ADDITIONAL COMMENTS
Confirmed post-op: Pt lives alone in an apt c 2 low steps, sarita rails,  far apart and unable to be reached simultaneously, at the entry. There is no stairs to negotiate in the apt unit.  Pt has a tub/shower  Pt has a tub/shower combo and regular toilet seat in BR. Pt is R handed and drives., fixed shower head, and regular toilet seat in BR. Pt is L handed and drives. Pt's son, Julio is near by and  will be available to assist pt in post -op care upon discharge home. Pt has a straight cane and it's in good condition.

## 2019-06-26 NOTE — PHYSICAL THERAPY INITIAL EVALUATION ADULT - GAIT TRAINING, PT EVAL
Pt will independently ambulate 250feet with rolling walker without loss of balance, by 2-3days. Pt will independently negotiate 6 steps with left ascending, left descending, step to pattern to enter and exit home, by 2-3 days

## 2019-06-26 NOTE — PATIENT PROFILE ADULT - ABILITY TO HEAR (WITH HEARING AID OR HEARING APPLIANCE IF NORMALLY USED):
hearing aid right ear left at home/Mildly to Moderately Impaired: difficulty hearing in some environments or speaker may need to increase volume or speak distinctly

## 2019-06-26 NOTE — DISCHARGE NOTE PROVIDER - CARE PROVIDER_API CALL
Rigoberto Almonte)  Orthopaedic Surgery  [address] [place] [address]  Phone: [phone]  Fax: [phone]  Follow Up Time:

## 2019-06-26 NOTE — DISCHARGE NOTE NURSING/CASE MANAGEMENT/SOCIAL WORK - NSDCPEWEB_GEN_ALL_CORE
[place] website --- [email or web address] website --- [email or web address]

## 2019-06-26 NOTE — PHYSICAL THERAPY INITIAL EVALUATION ADULT - GAIT DEVIATIONS NOTED, PT EVAL
increased time in double stance/decreased catarina/decreased step length/decreased weight-shifting ability

## 2019-06-26 NOTE — DISCHARGE NOTE NURSING/CASE MANAGEMENT/SOCIAL WORK - NSDCPEEMAIL_GEN_ALL_CORE
[facility] email [email or web address]

## 2019-06-26 NOTE — PHYSICAL THERAPY INITIAL EVALUATION ADULT - PASSIVE RANGE OF MOTION EXAMINATION, REHAB EVAL
no Passive ROM deficits were identified/no right hip flexion past 90, no right hip internal rotation, and no right hip adduction past neutral secondary to hip precautions

## 2019-06-26 NOTE — DISCHARGE NOTE PROVIDER - NSDCACTIVITY_GEN_ALL_CORE
Do not drive or operate machinery/Showering allowed/No heavy lifting/straining/Stairs allowed/Walking - Indoors allowed/Walking - Outdoors allowed

## 2019-06-27 LAB
ANION GAP SERPL CALC-SCNC: 7 MMOL/L — SIGNIFICANT CHANGE UP (ref 5–17)
BUN SERPL-MCNC: 14 MG/DL — SIGNIFICANT CHANGE UP (ref 7–23)
CALCIUM SERPL-MCNC: 9.2 MG/DL — SIGNIFICANT CHANGE UP (ref 8.5–10.1)
CHLORIDE SERPL-SCNC: 101 MMOL/L — SIGNIFICANT CHANGE UP (ref 96–108)
CO2 SERPL-SCNC: 32 MMOL/L — HIGH (ref 22–31)
CREAT SERPL-MCNC: 0.82 MG/DL — SIGNIFICANT CHANGE UP (ref 0.5–1.3)
GLUCOSE SERPL-MCNC: 142 MG/DL — HIGH (ref 70–99)
HCT VFR BLD CALC: 42.6 % — SIGNIFICANT CHANGE UP (ref 39–50)
HGB BLD-MCNC: 13.2 G/DL — SIGNIFICANT CHANGE UP (ref 13–17)
MCHC RBC-ENTMCNC: 26 PG — LOW (ref 27–34)
MCHC RBC-ENTMCNC: 31 GM/DL — LOW (ref 32–36)
MCV RBC AUTO: 84 FL — SIGNIFICANT CHANGE UP (ref 80–100)
NRBC # BLD: 0 /100 WBCS — SIGNIFICANT CHANGE UP (ref 0–0)
PLATELET # BLD AUTO: 238 K/UL — SIGNIFICANT CHANGE UP (ref 150–400)
POTASSIUM SERPL-MCNC: 4.2 MMOL/L — SIGNIFICANT CHANGE UP (ref 3.5–5.3)
POTASSIUM SERPL-SCNC: 4.2 MMOL/L — SIGNIFICANT CHANGE UP (ref 3.5–5.3)
RBC # BLD: 5.07 M/UL — SIGNIFICANT CHANGE UP (ref 4.2–5.8)
RBC # FLD: 15 % — HIGH (ref 10.3–14.5)
SODIUM SERPL-SCNC: 140 MMOL/L — SIGNIFICANT CHANGE UP (ref 135–145)
WBC # BLD: 10.44 K/UL — SIGNIFICANT CHANGE UP (ref 3.8–10.5)
WBC # FLD AUTO: 10.44 K/UL — SIGNIFICANT CHANGE UP (ref 3.8–10.5)

## 2019-06-27 RX ADMIN — Medication 325 MILLIGRAM(S): at 12:11

## 2019-06-27 RX ADMIN — SODIUM CHLORIDE 3 MILLILITER(S): 9 INJECTION INTRAMUSCULAR; INTRAVENOUS; SUBCUTANEOUS at 14:26

## 2019-06-27 RX ADMIN — Medication 325 MILLIGRAM(S): at 17:39

## 2019-06-27 RX ADMIN — Medication 1 MILLIGRAM(S): at 12:11

## 2019-06-27 RX ADMIN — Medication 100 MILLIGRAM(S): at 14:26

## 2019-06-27 RX ADMIN — Medication 150 MILLIGRAM(S): at 12:11

## 2019-06-27 RX ADMIN — OXYCODONE HYDROCHLORIDE 5 MILLIGRAM(S): 5 TABLET ORAL at 14:55

## 2019-06-27 RX ADMIN — Medication 100 MILLIGRAM(S): at 21:06

## 2019-06-27 RX ADMIN — Medication 325 MILLIGRAM(S): at 05:29

## 2019-06-27 RX ADMIN — Medication 5 MILLIGRAM(S): at 05:29

## 2019-06-27 RX ADMIN — ROPINIROLE 2 MILLIGRAM(S): 8 TABLET, FILM COATED, EXTENDED RELEASE ORAL at 18:22

## 2019-06-27 RX ADMIN — Medication 5 MILLIGRAM(S): at 18:22

## 2019-06-27 RX ADMIN — AMLODIPINE BESYLATE 5 MILLIGRAM(S): 2.5 TABLET ORAL at 05:29

## 2019-06-27 RX ADMIN — OXYCODONE HYDROCHLORIDE 5 MILLIGRAM(S): 5 TABLET ORAL at 11:20

## 2019-06-27 RX ADMIN — Medication 500 MILLIGRAM(S): at 17:39

## 2019-06-27 RX ADMIN — Medication 150 MILLIGRAM(S): at 17:39

## 2019-06-27 RX ADMIN — CELECOXIB 200 MILLIGRAM(S): 200 CAPSULE ORAL at 06:09

## 2019-06-27 RX ADMIN — Medication 150 MILLIGRAM(S): at 05:29

## 2019-06-27 RX ADMIN — SODIUM CHLORIDE 3 MILLILITER(S): 9 INJECTION INTRAMUSCULAR; INTRAVENOUS; SUBCUTANEOUS at 05:30

## 2019-06-27 RX ADMIN — Medication 100 MILLIGRAM(S): at 05:29

## 2019-06-27 RX ADMIN — OXYCODONE HYDROCHLORIDE 10 MILLIGRAM(S): 5 TABLET ORAL at 00:10

## 2019-06-27 RX ADMIN — Medication 975 MILLIGRAM(S): at 14:26

## 2019-06-27 RX ADMIN — Medication 102 MILLIGRAM(S): at 05:30

## 2019-06-27 RX ADMIN — POLYETHYLENE GLYCOL 3350 17 GRAM(S): 17 POWDER, FOR SOLUTION ORAL at 12:11

## 2019-06-27 RX ADMIN — Medication 975 MILLIGRAM(S): at 05:30

## 2019-06-27 RX ADMIN — Medication 975 MILLIGRAM(S): at 06:09

## 2019-06-27 RX ADMIN — Medication 325 MILLIGRAM(S): at 08:42

## 2019-06-27 RX ADMIN — OXYCODONE HYDROCHLORIDE 5 MILLIGRAM(S): 5 TABLET ORAL at 15:50

## 2019-06-27 RX ADMIN — Medication 500 MILLIGRAM(S): at 05:29

## 2019-06-27 RX ADMIN — Medication 975 MILLIGRAM(S): at 15:20

## 2019-06-27 RX ADMIN — OXYCODONE HYDROCHLORIDE 5 MILLIGRAM(S): 5 TABLET ORAL at 10:28

## 2019-06-27 RX ADMIN — Medication 1 TABLET(S): at 12:11

## 2019-06-27 RX ADMIN — ROPINIROLE 2 MILLIGRAM(S): 8 TABLET, FILM COATED, EXTENDED RELEASE ORAL at 05:29

## 2019-06-27 RX ADMIN — Medication 975 MILLIGRAM(S): at 21:06

## 2019-06-27 RX ADMIN — OXYCODONE HYDROCHLORIDE 5 MILLIGRAM(S): 5 TABLET ORAL at 00:10

## 2019-06-27 RX ADMIN — CELECOXIB 200 MILLIGRAM(S): 200 CAPSULE ORAL at 18:29

## 2019-06-27 RX ADMIN — CELECOXIB 200 MILLIGRAM(S): 200 CAPSULE ORAL at 17:39

## 2019-06-27 RX ADMIN — CELECOXIB 200 MILLIGRAM(S): 200 CAPSULE ORAL at 05:29

## 2019-06-27 RX ADMIN — PANTOPRAZOLE SODIUM 40 MILLIGRAM(S): 20 TABLET, DELAYED RELEASE ORAL at 07:49

## 2019-06-27 RX ADMIN — Medication 150 MILLIGRAM(S): at 23:56

## 2019-06-28 VITALS — HEART RATE: 66 BPM | SYSTOLIC BLOOD PRESSURE: 136 MMHG | TEMPERATURE: 98 F | DIASTOLIC BLOOD PRESSURE: 67 MMHG

## 2019-06-28 LAB
ANION GAP SERPL CALC-SCNC: 4 MMOL/L — LOW (ref 5–17)
BUN SERPL-MCNC: 23 MG/DL — SIGNIFICANT CHANGE UP (ref 7–23)
CALCIUM SERPL-MCNC: 8.7 MG/DL — SIGNIFICANT CHANGE UP (ref 8.5–10.1)
CHLORIDE SERPL-SCNC: 101 MMOL/L — SIGNIFICANT CHANGE UP (ref 96–108)
CO2 SERPL-SCNC: 35 MMOL/L — HIGH (ref 22–31)
CREAT SERPL-MCNC: 0.89 MG/DL — SIGNIFICANT CHANGE UP (ref 0.5–1.3)
GLUCOSE SERPL-MCNC: 184 MG/DL — HIGH (ref 70–99)
HCT VFR BLD CALC: 37.8 % — LOW (ref 39–50)
HGB BLD-MCNC: 12 G/DL — LOW (ref 13–17)
MCHC RBC-ENTMCNC: 26.6 PG — LOW (ref 27–34)
MCHC RBC-ENTMCNC: 31.7 GM/DL — LOW (ref 32–36)
MCV RBC AUTO: 83.8 FL — SIGNIFICANT CHANGE UP (ref 80–100)
NRBC # BLD: 0 /100 WBCS — SIGNIFICANT CHANGE UP (ref 0–0)
PLATELET # BLD AUTO: 236 K/UL — SIGNIFICANT CHANGE UP (ref 150–400)
POTASSIUM SERPL-MCNC: 4.8 MMOL/L — SIGNIFICANT CHANGE UP (ref 3.5–5.3)
POTASSIUM SERPL-SCNC: 4.8 MMOL/L — SIGNIFICANT CHANGE UP (ref 3.5–5.3)
RBC # BLD: 4.51 M/UL — SIGNIFICANT CHANGE UP (ref 4.2–5.8)
RBC # FLD: 15.2 % — HIGH (ref 10.3–14.5)
SODIUM SERPL-SCNC: 140 MMOL/L — SIGNIFICANT CHANGE UP (ref 135–145)
SURGICAL PATHOLOGY STUDY: SIGNIFICANT CHANGE UP
WBC # BLD: 13.21 K/UL — HIGH (ref 3.8–10.5)
WBC # FLD AUTO: 13.21 K/UL — HIGH (ref 3.8–10.5)

## 2019-06-28 PROCEDURE — 99238 HOSP IP/OBS DSCHRG MGMT 30/<: CPT

## 2019-06-28 RX ORDER — PANTOPRAZOLE SODIUM 20 MG/1
1 TABLET, DELAYED RELEASE ORAL
Qty: 30 | Refills: 0
Start: 2019-06-28 | End: 2019-07-27

## 2019-06-28 RX ORDER — OXYCODONE HYDROCHLORIDE 5 MG/1
2 TABLET ORAL
Qty: 50 | Refills: 0
Start: 2019-06-28 | End: 2019-07-02

## 2019-06-28 RX ORDER — ACETAMINOPHEN 500 MG
3 TABLET ORAL
Qty: 0 | Refills: 0 | DISCHARGE
Start: 2019-06-28

## 2019-06-28 RX ORDER — ASCORBIC ACID 60 MG
1 TABLET,CHEWABLE ORAL
Qty: 0 | Refills: 0 | DISCHARGE
Start: 2019-06-28

## 2019-06-28 RX ORDER — ASPIRIN/CALCIUM CARB/MAGNESIUM 324 MG
1 TABLET ORAL
Qty: 60 | Refills: 0
Start: 2019-06-28 | End: 2019-07-27

## 2019-06-28 RX ORDER — PANTOPRAZOLE SODIUM 20 MG/1
1 TABLET, DELAYED RELEASE ORAL
Qty: 0 | Refills: 0 | DISCHARGE

## 2019-06-28 RX ORDER — CELECOXIB 200 MG/1
1 CAPSULE ORAL
Qty: 60 | Refills: 0
Start: 2019-06-28 | End: 2019-07-27

## 2019-06-28 RX ORDER — IBUPROFEN 200 MG
1 TABLET ORAL
Qty: 0 | Refills: 0 | DISCHARGE

## 2019-06-28 RX ORDER — POLYETHYLENE GLYCOL 3350 17 G/17G
17 POWDER, FOR SOLUTION ORAL
Qty: 0 | Refills: 0 | DISCHARGE
Start: 2019-06-28

## 2019-06-28 RX ORDER — DOCUSATE SODIUM 100 MG
1 CAPSULE ORAL
Qty: 0 | Refills: 0 | DISCHARGE
Start: 2019-06-28

## 2019-06-28 RX ADMIN — Medication 500 MILLIGRAM(S): at 06:16

## 2019-06-28 RX ADMIN — SODIUM CHLORIDE 3 MILLILITER(S): 9 INJECTION INTRAMUSCULAR; INTRAVENOUS; SUBCUTANEOUS at 06:20

## 2019-06-28 RX ADMIN — CELECOXIB 200 MILLIGRAM(S): 200 CAPSULE ORAL at 07:10

## 2019-06-28 RX ADMIN — OXYCODONE HYDROCHLORIDE 10 MILLIGRAM(S): 5 TABLET ORAL at 01:30

## 2019-06-28 RX ADMIN — Medication 325 MILLIGRAM(S): at 06:16

## 2019-06-28 RX ADMIN — Medication 150 MILLIGRAM(S): at 06:17

## 2019-06-28 RX ADMIN — Medication 325 MILLIGRAM(S): at 09:01

## 2019-06-28 RX ADMIN — OXYCODONE HYDROCHLORIDE 10 MILLIGRAM(S): 5 TABLET ORAL at 00:48

## 2019-06-28 RX ADMIN — PANTOPRAZOLE SODIUM 40 MILLIGRAM(S): 20 TABLET, DELAYED RELEASE ORAL at 06:16

## 2019-06-28 RX ADMIN — CELECOXIB 200 MILLIGRAM(S): 200 CAPSULE ORAL at 06:17

## 2019-06-28 RX ADMIN — Medication 975 MILLIGRAM(S): at 06:17

## 2019-06-28 RX ADMIN — Medication 100 MILLIGRAM(S): at 06:16

## 2019-06-28 RX ADMIN — ROPINIROLE 2 MILLIGRAM(S): 8 TABLET, FILM COATED, EXTENDED RELEASE ORAL at 06:16

## 2019-06-28 RX ADMIN — Medication 5 MILLIGRAM(S): at 06:16

## 2019-06-28 RX ADMIN — AMLODIPINE BESYLATE 5 MILLIGRAM(S): 2.5 TABLET ORAL at 06:16

## 2019-06-28 NOTE — PROGRESS NOTE ADULT - SUBJECTIVE AND OBJECTIVE BOX
Patient is seen and examined at bedside. Denies CP/SOB/Dizziness/N/V/D/HA. Pain is controlled.     Vital Signs Last 24 Hrs  T(C): 36.5 (27 Jun 2019 09:34), Max: 36.8 (27 Jun 2019 05:30)  T(F): 97.7 (27 Jun 2019 09:34), Max: 98.2 (27 Jun 2019 05:30)  HR: 74 (27 Jun 2019 10:41) (65 - 74)  BP: 129/73 (27 Jun 2019 10:41) (117/55 - 161/79)  BP(mean): --  RR: 17 (27 Jun 2019 10:41) (16 - 17)  SpO2: 92% (27 Jun 2019 10:41) (90% - 97%)    GEN: NAD  ABD: soft, NT/ND; no rebound or guarding;  Neurologic: AAOx3; CNS grossly intact; no focal deficits  LE: Right hip prineo Dressing C/D/I.    B/L LE's: Motor intact + EHL/FHL/TA/GS in the BL LE. Sensation is grossly intact B/L. Extremities warm B/L. Compartments soft, compressible B/L, no calf tenderness B/L. DP 2+ B/L.    Labs:                          13.2   10.44 )-----------( 238      ( 27 Jun 2019 06:34 )             42.6       06-27    140  |  101  |  14  ----------------------------<  142<H>  4.2   |  32<H>  |  0.82    Ca    9.2      27 Jun 2019 06:34        A/P: Patient is a 79y y/o Male s/p right total hip arthroplasty, POD # 1  -wound care, isometric exercises, GI motility, new medications, hip precautions,  hospital course and discharge planning reviewed with pt  -Pain control/analgesia  -Inc spirometry reviewed and counseled  -Venodynes/foot pumps  -PT/OT/WBAT  -Anticoagulation ASA 325mg BID  -Pt seen in am with DR Almnote  -DOUGLAS plan home tomorrow
EITAN JORDAN is a 79y Male s/p RIGHT TOTAL HIP ARTHROPLASTY      denies any chest pain shortness of breath palpitation dizziness lightheadedness nausea vomiting fever or chills    T(C): 36.2 (06-28-19 @ 06:05), Max: 36.6 (06-27-19 @ 19:37)  HR: 62 (06-28-19 @ 06:05) (62 - 77)  BP: 122/64 (06-28-19 @ 06:05) (115/67 - 143/63)  RR: 16 (06-28-19 @ 06:05) (16 - 17)  SpO2: 97% (06-28-19 @ 06:05) (92% - 97%)  no jvd/bruit  s1 s2 rrr  cta  s/nt/nd  no calf tend                          12.0   13.21 )-----------( 236      ( 28 Jun 2019 06:30 )             37.8   06-28    140  |  101  |  23  ----------------------------<  184<H>  4.8   |  35<H>  |  0.89    Ca    8.7      28 Jun 2019 06:30    ^wbc podt op fu op  cont dvt px  pain control  bowel regimen  antiemetics  incentive spirometer
EITAN JORDAN is a 79y Male s/p RIGHT TOTAL HIP ARTHROPLASTY      denies any chest pain shortness of breath palpitation dizziness lightheadedness nausea vomiting fever or chills    T(C): 36.5 (06-27-19 @ 09:34), Max: 36.8 (06-27-19 @ 05:30)  HR: 69 (06-27-19 @ 09:34) (56 - 74)  BP: 117/55 (06-27-19 @ 09:34) (101/61 - 161/79)  RR: 17 (06-27-19 @ 09:34) (16 - 17)  SpO2: 95% (06-27-19 @ 09:34) (90% - 97%)  no jvd/bruit  s1 s2 rrr  cta  s/nt/nd  no calf tend                        13.2   10.44 )-----------( 238      ( 27 Jun 2019 06:34 )             42.6   06-27    140  |  101  |  14  ----------------------------<  142<H>  4.2   |  32<H>  |  0.82    Ca    9.2      27 Jun 2019 06:34        cont dvt px  pain control  bowel regimen  antiemetics  incentive spirometer
Patient is seen and examined at bedside. Denies CP/SOB/Dizziness/N/V/D/HA. Pain is controlled.     Vital Signs Last 24 Hrs  T(C): 36.2 (28 Jun 2019 06:05), Max: 36.6 (27 Jun 2019 19:37)  T(F): 97.2 (28 Jun 2019 06:05), Max: 97.8 (27 Jun 2019 19:37)  HR: 62 (28 Jun 2019 06:05) (62 - 77)  BP: 122/64 (28 Jun 2019 06:05) (115/67 - 143/63)  BP(mean): --  RR: 16 (28 Jun 2019 06:05) (16 - 17)  SpO2: 97% (28 Jun 2019 06:05) (92% - 97%)    GEN: NAD  ABD: soft, NT/ND; no rebound or guarding;  Neurologic: AAOx3; CNS grossly intact; no focal deficits  Right hip: Prineo Dressing C/D/I.    B/L LE's: Motor intact + EHL/FHL/TA/GS in the BL LE. Sensation is grossly intact B/L. Extremities warm B/L. Compartments soft, compressible B/L, no calf tenderness B/L. DP 2+ B/L.    Labs:                          12.0   13.21 )-----------( 236      ( 28 Jun 2019 06:30 )             37.8       06-28    140  |  101  |  23  ----------------------------<  184<H>  4.8   |  35<H>  |  0.89    Ca    8.7      28 Jun 2019 06:30        A/P: Patient is a 79y y/o Male s/p right total hip arthroplasty, POD # 1  -wound care, isometric exercises, GI motility, new medications, hip precautions,  hospital course and discharge planning reviewed with pt  -Pain control/analgesia: oxy 5/10  -Inc spirometry reviewed and counseled  -Venodynes/foot pumps  -PT/OT/WBAT  -Anticoagulation: ASA 325mg BID  -Pt seen in am with Dr Almonte  -DOUGLAS plan: Home today
Post-op Check   POD#0 S/P Right SATINDER  79yMale Patient seen and examined, Pain controlled  Patient Denies SOB, CP, N/V/D       PE: Right Hip/LE: Dressing C/D/I, Sensation/motor intact, DP 2+, FROM ankle/toes  B/L LE: Skin intact. +ROM hip/knee/ankle/toes. Ankle Dorsi/plantarflexion: 5/5. Calf: soft, compressible and nontender. DP/PT 2+ NVI.                           12.1   9.08  )-----------( 215      ( 26 Jun 2019 09:20 )             38.4       06-26    142  |  107  |  21  ----------------------------<  122<H>  4.3   |  29  |  0.95                                                                                                                                                                                                                                                                                                       Ca    8.2<L>      26 Jun 2019 09:20          A: As above   P: Pain Control       DVT Prophylaxis      Incentive spirometry      Total hip precautions Reviewed       PT WBAT RLE      Isometric exercises      Discharge Planning      All the above discussed and understood by pt       Ortho to F/U

## 2019-07-02 DIAGNOSIS — M16.11 UNILATERAL PRIMARY OSTEOARTHRITIS, RIGHT HIP: ICD-10-CM

## 2019-07-02 DIAGNOSIS — C67.9 MALIGNANT NEOPLASM OF BLADDER, UNSPECIFIED: ICD-10-CM

## 2019-07-02 DIAGNOSIS — G25.81 RESTLESS LEGS SYNDROME: ICD-10-CM

## 2019-07-02 DIAGNOSIS — K22.0 ACHALASIA OF CARDIA: ICD-10-CM

## 2019-07-02 DIAGNOSIS — K21.9 GASTRO-ESOPHAGEAL REFLUX DISEASE WITHOUT ESOPHAGITIS: ICD-10-CM

## 2019-07-02 DIAGNOSIS — I10 ESSENTIAL (PRIMARY) HYPERTENSION: ICD-10-CM

## 2019-07-02 DIAGNOSIS — G62.9 POLYNEUROPATHY, UNSPECIFIED: ICD-10-CM

## 2019-07-02 DIAGNOSIS — G47.33 OBSTRUCTIVE SLEEP APNEA (ADULT) (PEDIATRIC): ICD-10-CM

## 2019-07-16 PROBLEM — G62.9 POLYNEUROPATHY, UNSPECIFIED: Chronic | Status: ACTIVE | Noted: 2019-06-26

## 2019-07-16 PROBLEM — I10 ESSENTIAL (PRIMARY) HYPERTENSION: Chronic | Status: ACTIVE | Noted: 2019-06-13

## 2019-07-16 PROBLEM — G47.30 SLEEP APNEA, UNSPECIFIED: Chronic | Status: ACTIVE | Noted: 2019-06-13

## 2019-07-16 PROBLEM — C67.9 MALIGNANT NEOPLASM OF BLADDER, UNSPECIFIED: Chronic | Status: ACTIVE | Noted: 2019-06-13

## 2019-07-16 PROBLEM — Z86.69 PERSONAL HISTORY OF OTHER DISEASES OF THE NERVOUS SYSTEM AND SENSE ORGANS: Chronic | Status: ACTIVE | Noted: 2019-06-13

## 2019-07-16 PROBLEM — Z98.41 CATARACT EXTRACTION STATUS, RIGHT EYE: Chronic | Status: ACTIVE | Noted: 2019-06-13

## 2019-07-16 PROBLEM — K21.9 GASTRO-ESOPHAGEAL REFLUX DISEASE WITHOUT ESOPHAGITIS: Chronic | Status: ACTIVE | Noted: 2019-06-13

## 2019-07-16 PROBLEM — K22.0 ACHALASIA OF CARDIA: Chronic | Status: ACTIVE | Noted: 2019-06-13

## 2019-07-18 ENCOUNTER — APPOINTMENT (OUTPATIENT)
Dept: FAMILY MEDICINE | Facility: CLINIC | Age: 79
End: 2019-07-18
Payer: MEDICARE

## 2019-07-18 VITALS
BODY MASS INDEX: 41.41 KG/M2 | DIASTOLIC BLOOD PRESSURE: 68 MMHG | WEIGHT: 225 LBS | HEIGHT: 62 IN | SYSTOLIC BLOOD PRESSURE: 128 MMHG

## 2019-07-18 DIAGNOSIS — Z82.0 FAMILY HISTORY OF EPILEPSY AND OTHER DISEASES OF THE NERVOUS SYSTEM: ICD-10-CM

## 2019-07-18 DIAGNOSIS — Z80.1 FAMILY HISTORY OF MALIGNANT NEOPLASM OF TRACHEA, BRONCHUS AND LUNG: ICD-10-CM

## 2019-07-18 PROCEDURE — 99214 OFFICE O/P EST MOD 30 MIN: CPT

## 2019-07-18 PROCEDURE — 99204 OFFICE O/P NEW MOD 45 MIN: CPT

## 2019-07-18 RX ORDER — MV-MIN/FOLIC/K1/LYCOPEN/LUTEIN 300-60 MCG
TABLET ORAL
Refills: 0 | Status: ACTIVE | COMMUNITY

## 2019-07-18 NOTE — HISTORY OF PRESENT ILLNESS
[FreeTextEntry8] : Kemal is a 79-year old male who presents as a new patient to establish care due to his chronic medical conditions. He recently moved to [facility] and is 1 month s/p right hip replacement. \par His chronic medical conditions include:\par HTN - controlled with medication\par GERD - well controlled with ranitidine and pantoprazole\par Neuropathy/RLS - controlled, maintained on Lyrica and ropinirole (follows with neurology)\par \par He has no complaints at this time.

## 2019-07-18 NOTE — ASSESSMENT
[FreeTextEntry1] : Will request records form previous PCP to determine HCM.\par \par Return in Oct/Nov. for Flu vaccine or sooner if needed.

## 2019-07-18 NOTE — PHYSICAL EXAM
[No Acute Distress] : no acute distress [Well Developed] : well developed [Normal Sclera/Conjunctiva] : normal sclera/conjunctiva [Normal Outer Ear/Nose] : the outer ears and nose were normal in appearance [Normal Oropharynx] : the oropharynx was normal [No Lymphadenopathy] : no lymphadenopathy [Supple] : supple [Thyroid Normal, No Nodules] : the thyroid was normal and there were no nodules present [No Respiratory Distress] : no respiratory distress  [No Accessory Muscle Use] : no accessory muscle use [Clear to Auscultation] : lungs were clear to auscultation bilaterally [Normal Rate] : normal rate  [Normal S1, S2] : normal S1 and S2 [Pedal Pulses Present] : the pedal pulses are present [No Extremity Clubbing/Cyanosis] : no extremity clubbing/cyanosis [Soft] : abdomen soft [Non Tender] : non-tender [Normal Bowel Sounds] : normal bowel sounds [Normal Posterior Cervical Nodes] : no posterior cervical lymphadenopathy [Normal Anterior Cervical Nodes] : no anterior cervical lymphadenopathy [Coordination Grossly Intact] : coordination grossly intact [Normal Affect] : the affect was normal [Alert and Oriented x3] : oriented to person, place, and time [Normal Insight/Judgement] : insight and judgment were intact [de-identified] : mild edema on right LE [de-identified] : ambulating with cane

## 2019-07-29 ENCOUNTER — RX RENEWAL (OUTPATIENT)
Age: 79
End: 2019-07-29

## 2019-07-29 ENCOUNTER — APPOINTMENT (OUTPATIENT)
Dept: FAMILY MEDICINE | Facility: CLINIC | Age: 79
End: 2019-07-29
Payer: MEDICARE

## 2019-07-29 VITALS
BODY MASS INDEX: 40.74 KG/M2 | WEIGHT: 221.38 LBS | HEART RATE: 78 BPM | HEIGHT: 62 IN | OXYGEN SATURATION: 98 % | SYSTOLIC BLOOD PRESSURE: 134 MMHG | DIASTOLIC BLOOD PRESSURE: 62 MMHG

## 2019-07-29 PROCEDURE — 36415 COLL VENOUS BLD VENIPUNCTURE: CPT

## 2019-07-29 PROCEDURE — 99214 OFFICE O/P EST MOD 30 MIN: CPT | Mod: 25

## 2019-07-29 NOTE — HISTORY OF PRESENT ILLNESS
[FreeTextEntry8] : Adryan is a 79-year old male who presents with complaint of worsening fatigue over the past few weeks. He reports that he sleeps about 8 hours at night, awakens and watches TV for  few hours, then takes a nap of about 2 hours. Reports that he does not have much to do during the day. \par Reports that he is engaged in PT s/p hip surgery but aside from that he does not ambulate much due to discomfort from neuropathy.\par Nothing makes the fatigue better, nothing makes it worse.\par \par He also mentions the scab on his right calf is improving but taking a long time to heal.\par \par HTN - well controlled with amlodipine.\par \par \par patient presents today for a scab on the right leg. states it is looking better. Patient also has been feeling very fatigue.

## 2019-08-01 LAB
25(OH)D3 SERPL-MCNC: 32.4 NG/ML
ALBUMIN SERPL ELPH-MCNC: 4.5 G/DL
ALP BLD-CCNC: 117 U/L
ALT SERPL-CCNC: 18 U/L
ANION GAP SERPL CALC-SCNC: 15 MMOL/L
AST SERPL-CCNC: 18 U/L
BASOPHILS # BLD AUTO: 0.08 K/UL
BASOPHILS NFR BLD AUTO: 0.9 %
BILIRUB SERPL-MCNC: 0.2 MG/DL
BUN SERPL-MCNC: 17 MG/DL
CALCIUM SERPL-MCNC: 9.5 MG/DL
CHLORIDE SERPL-SCNC: 100 MMOL/L
CO2 SERPL-SCNC: 27 MMOL/L
CREAT SERPL-MCNC: 0.79 MG/DL
EOSINOPHIL # BLD AUTO: 0.29 K/UL
EOSINOPHIL NFR BLD AUTO: 3.3 %
GLUCOSE SERPL-MCNC: 100 MG/DL
HCT VFR BLD CALC: 43.9 %
HGB BLD-MCNC: 13.1 G/DL
IMM GRANULOCYTES NFR BLD AUTO: 0.5 %
LYMPHOCYTES # BLD AUTO: 1.64 K/UL
LYMPHOCYTES NFR BLD AUTO: 18.6 %
MAN DIFF?: NORMAL
MCHC RBC-ENTMCNC: 26.8 PG
MCHC RBC-ENTMCNC: 29.8 GM/DL
MCV RBC AUTO: 90 FL
MONOCYTES # BLD AUTO: 0.75 K/UL
MONOCYTES NFR BLD AUTO: 8.5 %
NEUTROPHILS # BLD AUTO: 6.01 K/UL
NEUTROPHILS NFR BLD AUTO: 68.2 %
PLATELET # BLD AUTO: 274 K/UL
POTASSIUM SERPL-SCNC: 4.2 MMOL/L
PROT SERPL-MCNC: 7.5 G/DL
RBC # BLD: 4.88 M/UL
RBC # FLD: 16.2 %
SODIUM SERPL-SCNC: 142 MMOL/L
TSH SERPL-ACNC: 1.3 UIU/ML
VIT B12 SERPL-MCNC: 521 PG/ML
WBC # FLD AUTO: 8.81 K/UL

## 2019-08-27 ENCOUNTER — RX CHANGE (OUTPATIENT)
Age: 79
End: 2019-08-27

## 2019-08-28 ENCOUNTER — MEDICATION RENEWAL (OUTPATIENT)
Age: 79
End: 2019-08-28

## 2019-09-10 ENCOUNTER — APPOINTMENT (OUTPATIENT)
Dept: FAMILY MEDICINE | Facility: CLINIC | Age: 79
End: 2019-09-10
Payer: MEDICARE

## 2019-09-10 VITALS
HEART RATE: 84 BPM | OXYGEN SATURATION: 93 % | SYSTOLIC BLOOD PRESSURE: 132 MMHG | HEIGHT: 62 IN | DIASTOLIC BLOOD PRESSURE: 50 MMHG | RESPIRATION RATE: 14 BRPM | TEMPERATURE: 98.1 F

## 2019-09-10 DIAGNOSIS — Z23 ENCOUNTER FOR IMMUNIZATION: ICD-10-CM

## 2019-09-10 DIAGNOSIS — R23.4 CHANGES IN SKIN TEXTURE: ICD-10-CM

## 2019-09-10 DIAGNOSIS — H53.9 UNSPECIFIED VISUAL DISTURBANCE: ICD-10-CM

## 2019-09-10 PROCEDURE — 99214 OFFICE O/P EST MOD 30 MIN: CPT | Mod: 25

## 2019-09-10 PROCEDURE — 90662 IIV NO PRSV INCREASED AG IM: CPT

## 2019-09-10 PROCEDURE — G0008: CPT

## 2019-09-10 NOTE — HEALTH RISK ASSESSMENT
[No] : In the past 12 months have you used drugs other than those required for medical reasons? No [No falls in past year] : Patient reported no falls in the past year [0] : 2) Feeling down, depressed, or hopeless: Not at all (0) [] : No [YearQuit] : 1986

## 2019-09-10 NOTE — PHYSICAL EXAM
[Normal] : no acute distress, well nourished, well developed and well-appearing [Normal Sclera/Conjunctiva] : normal sclera/conjunctiva [PERRL] : pupils equal round and reactive to light [EOMI] : extraocular movements intact [Normal Outer Ear/Nose] : the outer ears and nose were normal in appearance [Fundoscopic Exam Performed] : fundoscopic ~T exam ~C was performed [No Respiratory Distress] : no respiratory distress  [No Accessory Muscle Use] : no accessory muscle use [Non-distended] : non-distended [No Extremity Clubbing/Cyanosis] : no extremity clubbing/cyanosis [No Rash] : no rash [Alert and Oriented x3] : oriented to person, place, and time [de-identified] : nails look okay [de-identified] : ambulates with cane

## 2019-09-10 NOTE — HISTORY OF PRESENT ILLNESS
[FreeTextEntry8] : Kemal is a 79-year old male who presents with a 4-5 year history of flashes in his vision. Occurs daily. Associated with a black dot on the right eye. Reports that he saw opthalmology about 1 year ago and was told that everything is okay. He reports that there has been no change in the flashing or the black spot. Denies floaters. Nothing makes it better, nothing makes it worse.\par \par HTN - stable and controlled. Compliant with medication\par Fatigue - reports that he sleeps 8 hours every night and will still take a nap during the day, recent labs in 7/19 show no clear etiology, does have h.o MUMTAZ.\par \par \par Pt c/o vision issues, seeing flashes \par [facility]

## 2019-09-10 NOTE — ASSESSMENT
[FreeTextEntry1] : Reports that he had PNA vaccine last year.\par \par Note sent to  regarding requesting vaccination records from previous PCP.

## 2019-09-13 ENCOUNTER — TRANSCRIPTION ENCOUNTER (OUTPATIENT)
Age: 79
End: 2019-09-13

## 2019-09-16 ENCOUNTER — RX RENEWAL (OUTPATIENT)
Age: 79
End: 2019-09-16

## 2019-09-20 ENCOUNTER — APPOINTMENT (OUTPATIENT)
Dept: UROLOGY | Facility: CLINIC | Age: 79
End: 2019-09-20
Payer: MEDICARE

## 2019-09-20 VITALS
DIASTOLIC BLOOD PRESSURE: 69 MMHG | WEIGHT: 221 LBS | TEMPERATURE: 98 F | HEIGHT: 62 IN | HEART RATE: 81 BPM | SYSTOLIC BLOOD PRESSURE: 126 MMHG | BODY MASS INDEX: 40.67 KG/M2

## 2019-09-20 PROCEDURE — 99204 OFFICE O/P NEW MOD 45 MIN: CPT

## 2019-09-20 NOTE — HISTORY OF PRESENT ILLNESS
[FreeTextEntry1] : 79 year old male PMH bladder cancer 7 years ago, HTN, GERD, neuropathy PSH TURBT 7 years ago, bilat hip replacement, back surgery . Dr. Dumont was  Urologist who did TURBT.  Pt comes in bc [place]. Denies having bladder instillations for his cancer. Last urine cytology unknown. Last cystoscopy a couple of months ago and was told to follow up in a year. Pt denies any problems urinating. Erections aren't great but he has no interest in sex at this time.  \par \par SURG: TURBT\par FAMHX: no  cancer\par SOCIAL: past smoker quit 35 years ago after 25 years of 1.5 ppd\par ROS: Negative 10 system

## 2019-09-20 NOTE — PHYSICAL EXAM
[General Appearance - Well Nourished] : well nourished [General Appearance - Well Developed] : well developed [Normal Appearance] : normal appearance [Well Groomed] : well groomed [General Appearance - In No Acute Distress] : no acute distress [Edema] : no peripheral edema [] : no respiratory distress [Respiration, Rhythm And Depth] : normal respiratory rhythm and effort [Exaggerated Use Of Accessory Muscles For Inspiration] : no accessory muscle use [Abdomen Soft] : soft [Abdomen Tenderness] : non-tender [Costovertebral Angle Tenderness] : no ~M costovertebral angle tenderness [Urethral Meatus] : meatus normal [Scrotum] : the scrotum was normal [Urinary Bladder Findings] : the bladder was normal on palpation [Penis Abnormality] : normal circumcised penis [Testes Tenderness] : no tenderness of the testes [Epididymis] : the epididymides were normal [Testes Mass (___cm)] : there were no testicular masses [Prostate Tenderness] : the prostate was not tender [Normal Station and Gait] : the gait and station were normal for the patient's age [No Prostate Nodules] : no prostate nodules [No Palpable Adenopathy] : no palpable adenopathy

## 2019-09-20 NOTE — ASSESSMENT
[FreeTextEntry1] : Patient with history of bladder cancer \par Cancer 7 yrs ago with no recurrences\par Cysto in one year given recent negative cysto\par cytology

## 2019-10-21 ENCOUNTER — RECORD ABSTRACTING (OUTPATIENT)
Age: 79
End: 2019-10-21

## 2019-10-21 DIAGNOSIS — R35.0 FREQUENCY OF MICTURITION: ICD-10-CM

## 2019-10-22 ENCOUNTER — APPOINTMENT (OUTPATIENT)
Dept: FAMILY MEDICINE | Facility: CLINIC | Age: 79
End: 2019-10-22
Payer: MEDICARE

## 2019-10-22 VITALS
SYSTOLIC BLOOD PRESSURE: 110 MMHG | DIASTOLIC BLOOD PRESSURE: 64 MMHG | OXYGEN SATURATION: 94 % | TEMPERATURE: 97.9 F | HEIGHT: 62 IN | HEART RATE: 90 BPM

## 2019-10-22 VITALS — OXYGEN SATURATION: 96 %

## 2019-10-22 DIAGNOSIS — Z23 ENCOUNTER FOR IMMUNIZATION: ICD-10-CM

## 2019-10-22 PROCEDURE — 90670 PCV13 VACCINE IM: CPT

## 2019-10-22 PROCEDURE — 36415 COLL VENOUS BLD VENIPUNCTURE: CPT

## 2019-10-22 PROCEDURE — G0009: CPT

## 2019-10-22 PROCEDURE — 99214 OFFICE O/P EST MOD 30 MIN: CPT | Mod: 25

## 2019-10-22 NOTE — HISTORY OF PRESENT ILLNESS
[FreeTextEntry1] : Patient is here for a 3 month follow up.  [de-identified] : HTN - well controlled, compliant with medication\par Fatigue - Reports that it has improved since changin CPAP mask\par Wheezing at night - reports that he wheezes right before going to bed at night; reports using an inhaler in the morning (does not recall the name)\par Bladder cancer - now seeing urology, cystoscopy next year\par \par

## 2019-10-22 NOTE — PHYSICAL EXAM
[Normal] : normal sclera/conjunctiva, pupils are equal, round and reactive to light and extraocular movements are intact [Normal Outer Ear/Nose] : the outer ears and nose were normal in appearance [No Respiratory Distress] : no respiratory distress  [No Accessory Muscle Use] : no accessory muscle use [Normal Rate] : normal rate  [Clear to Auscultation] : lungs were clear to auscultation bilaterally [Normal S1, S2] : normal S1 and S2 [Pedal Pulses Present] : the pedal pulses are present [No Extremity Clubbing/Cyanosis] : no extremity clubbing/cyanosis [Soft] : abdomen soft [Non Tender] : non-tender [Non-distended] : non-distended [No Rash] : no rash [Normal Bowel Sounds] : normal bowel sounds [de-identified] : ambulates with cane [Coordination Grossly Intact] : coordination grossly intact [de-identified] : trace pitting edema on the right LE [Alert and Oriented x3] : oriented to person, place, and time

## 2019-10-29 LAB
ALBUMIN SERPL ELPH-MCNC: 4.3 G/DL
ALP BLD-CCNC: 87 U/L
ALT SERPL-CCNC: 20 U/L
ANION GAP SERPL CALC-SCNC: 15 MMOL/L
AST SERPL-CCNC: 17 U/L
BASOPHILS # BLD AUTO: 0.06 K/UL
BASOPHILS NFR BLD AUTO: 0.8 %
BILIRUB SERPL-MCNC: 0.2 MG/DL
BUN SERPL-MCNC: 26 MG/DL
CALCIUM SERPL-MCNC: 9.5 MG/DL
CHLORIDE SERPL-SCNC: 99 MMOL/L
CHOLEST SERPL-MCNC: 157 MG/DL
CHOLEST/HDLC SERPL: 4.8 RATIO
CO2 SERPL-SCNC: 25 MMOL/L
CREAT SERPL-MCNC: 0.84 MG/DL
EOSINOPHIL # BLD AUTO: 0.25 K/UL
EOSINOPHIL NFR BLD AUTO: 3.2 %
ESTIMATED AVERAGE GLUCOSE: 154 MG/DL
GLUCOSE SERPL-MCNC: 206 MG/DL
HBA1C MFR BLD HPLC: 7 %
HCT VFR BLD CALC: 46 %
HDLC SERPL-MCNC: 33 MG/DL
HGB BLD-MCNC: 13.5 G/DL
IMM GRANULOCYTES NFR BLD AUTO: 0.5 %
LDLC SERPL CALC-MCNC: 60 MG/DL
LYMPHOCYTES # BLD AUTO: 1.1 K/UL
LYMPHOCYTES NFR BLD AUTO: 14.1 %
MAN DIFF?: NORMAL
MCHC RBC-ENTMCNC: 25.6 PG
MCHC RBC-ENTMCNC: 29.3 GM/DL
MCV RBC AUTO: 87.1 FL
MONOCYTES # BLD AUTO: 0.58 K/UL
MONOCYTES NFR BLD AUTO: 7.4 %
NEUTROPHILS # BLD AUTO: 5.76 K/UL
NEUTROPHILS NFR BLD AUTO: 74 %
PLATELET # BLD AUTO: 265 K/UL
POTASSIUM SERPL-SCNC: 4.5 MMOL/L
PROT SERPL-MCNC: 7 G/DL
RBC # BLD: 5.28 M/UL
RBC # FLD: 16.6 %
SODIUM SERPL-SCNC: 139 MMOL/L
TRIGL SERPL-MCNC: 321 MG/DL
WBC # FLD AUTO: 7.79 K/UL

## 2019-11-04 ENCOUNTER — APPOINTMENT (OUTPATIENT)
Dept: FAMILY MEDICINE | Facility: CLINIC | Age: 79
End: 2019-11-04
Payer: MEDICARE

## 2019-11-04 VITALS — OXYGEN SATURATION: 98 %

## 2019-11-04 VITALS
HEART RATE: 73 BPM | HEIGHT: 62 IN | SYSTOLIC BLOOD PRESSURE: 126 MMHG | OXYGEN SATURATION: 92 % | TEMPERATURE: 97.9 F | DIASTOLIC BLOOD PRESSURE: 58 MMHG

## 2019-11-04 DIAGNOSIS — Z87.898 PERSONAL HISTORY OF OTHER SPECIFIED CONDITIONS: ICD-10-CM

## 2019-11-04 PROCEDURE — 99214 OFFICE O/P EST MOD 30 MIN: CPT

## 2019-11-04 NOTE — HISTORY OF PRESENT ILLNESS
[FreeTextEntry1] : followup on lab work [de-identified] : 9-year-old male who presents to followup on blood work.\par Recent lab work shows a diagnosis of diabetes, hemoglobin A1c 7.0. His previous A1c in May was 6.5.\par he reports that he does not watch his diet and eats a lot of hungry man present in areas.\par labs also show an elevated triglyceride level.\par he reports that he's been wheezing every night.\par hypertension - controlled compliant with medication.

## 2019-11-04 NOTE — PHYSICAL EXAM
[Normal Sclera/Conjunctiva] : normal sclera/conjunctiva [Normal Outer Ear/Nose] : the outer ears and nose were normal in appearance [Normal Oropharynx] : the oropharynx was normal [Normal TMs] : both tympanic membranes were normal [No Lymphadenopathy] : no lymphadenopathy [Supple] : supple [No Respiratory Distress] : no respiratory distress  [No Accessory Muscle Use] : no accessory muscle use [Clear to Auscultation] : lungs were clear to auscultation bilaterally [Normal Rate] : normal rate  [Normal S1, S2] : normal S1 and S2 [Soft] : abdomen soft [Non-distended] : non-distended [Normal] : no posterior cervical lymphadenopathy and no anterior cervical lymphadenopathy [No Rash] : no rash [Coordination Grossly Intact] : coordination grossly intact [Normal Affect] : the affect was normal [Alert and Oriented x3] : oriented to person, place, and time [Normal Insight/Judgement] : insight and judgment were intact [de-identified] : ambulates with cane

## 2019-11-04 NOTE — HISTORY OF PRESENT ILLNESS
[FreeTextEntry1] : followup on lab work [de-identified] : 9-year-old male who presents to followup on blood work.\par Recent lab work shows a diagnosis of diabetes, hemoglobin A1c 7.0. His previous A1c in May was 6.5.\par he reports that he does not watch his diet and eats a lot of hungry man present in areas.\par labs also show an elevated triglyceride level.\par he reports that he's been wheezing every night.\par hypertension - controlled compliant with medication.

## 2019-11-04 NOTE — PHYSICAL EXAM
[Normal Sclera/Conjunctiva] : normal sclera/conjunctiva [Normal Outer Ear/Nose] : the outer ears and nose were normal in appearance [Normal Oropharynx] : the oropharynx was normal [Normal TMs] : both tympanic membranes were normal [No Lymphadenopathy] : no lymphadenopathy [Supple] : supple [No Respiratory Distress] : no respiratory distress  [No Accessory Muscle Use] : no accessory muscle use [Clear to Auscultation] : lungs were clear to auscultation bilaterally [Normal Rate] : normal rate  [Normal S1, S2] : normal S1 and S2 [Soft] : abdomen soft [Non-distended] : non-distended [Normal] : no posterior cervical lymphadenopathy and no anterior cervical lymphadenopathy [No Rash] : no rash [Coordination Grossly Intact] : coordination grossly intact [Normal Affect] : the affect was normal [Alert and Oriented x3] : oriented to person, place, and time [Normal Insight/Judgement] : insight and judgment were intact [de-identified] : ambulates with cane

## 2019-11-15 ENCOUNTER — APPOINTMENT (OUTPATIENT)
Dept: NEUROSURGERY | Facility: CLINIC | Age: 79
End: 2019-11-15

## 2019-11-21 ENCOUNTER — APPOINTMENT (OUTPATIENT)
Dept: DERMATOLOGY | Facility: CLINIC | Age: 79
End: 2019-11-21

## 2019-11-22 ENCOUNTER — APPOINTMENT (OUTPATIENT)
Dept: NEUROSURGERY | Facility: CLINIC | Age: 79
End: 2019-11-22

## 2019-12-01 ENCOUNTER — RX RENEWAL (OUTPATIENT)
Age: 79
End: 2019-12-01

## 2019-12-06 ENCOUNTER — APPOINTMENT (OUTPATIENT)
Dept: NEUROSURGERY | Facility: CLINIC | Age: 79
End: 2019-12-06
Payer: MEDICARE

## 2019-12-06 VITALS
WEIGHT: 222 LBS | BODY MASS INDEX: 40.85 KG/M2 | HEART RATE: 67 BPM | HEIGHT: 62 IN | DIASTOLIC BLOOD PRESSURE: 70 MMHG | SYSTOLIC BLOOD PRESSURE: 140 MMHG

## 2019-12-06 DIAGNOSIS — M54.5 LOW BACK PAIN: ICD-10-CM

## 2019-12-06 DIAGNOSIS — G89.29 LOW BACK PAIN: ICD-10-CM

## 2019-12-06 PROCEDURE — 99202 OFFICE O/P NEW SF 15 MIN: CPT

## 2019-12-06 PROCEDURE — 99204 OFFICE O/P NEW MOD 45 MIN: CPT

## 2019-12-06 NOTE — REVIEW OF SYSTEMS
[Leg Weakness] : no leg weakness [Tingling] : no tingling [Numbness] : no numbness [Difficulty Walking] : no difficulty walking

## 2019-12-06 NOTE — RESULTS/DATA
[FreeTextEntry1] : \par MRI of the lumbar spine reveals presence of the laminectomy and but appears to be healed discitis at L5-S1.

## 2019-12-06 NOTE — HISTORY OF PRESENT ILLNESS
[de-identified] : \lydia Cornelius is a 79-year-old he has a history of total hip replacement on the left 2011. This was done at [facility]. He seems to subsequently developed a spinal infection and had a laminectomy from L3-S1.  He had his other hip done in 2017. He is here for evaluation of his lumbar spine. He has only axial back pain no evidence of cauda equina compression or significant neurogenic claudication.  He has a non-contrasted MRI for my review.

## 2019-12-06 NOTE — REASON FOR VISIT
[New Patient Visit] : a new patient visit [FreeTextEntry1] : Lower back pain- ongoing for about 4 years. sharp/ stabbing pain. patient has gone to pt for his back a few visits doesn’t give much relief.

## 2019-12-06 NOTE — PLAN
[FreeTextEntry1] : \par DIAGNOSIS:  DISK DEGENERATION  S/P LAMINECTOMY FOR OSTEODISCITIS  \par TREATMENT PLAN:  NON-SURGICAL\par \par This is a patient with a degenerated lumbar disk.  He is status post laminectomy for osteodiscitis in the distant past.  I have recommended nonsurgical management at this time.  This consists of physical therapy and/or manual medicine in conjunction to medical therapy and other conservative methods.  These include the consideration of trigger point injections and or the utilization of modalities such as TENS where applicable.  The next tier would be the referral to a pain management specialist (anesthesia or physiatry) for the consideration of spinal injections.  This includes the options of epidural steroids, facet injections as well as other novel techniques that may provide pain relief.  Although there is indeed evidence of disk degeneration on imaging, discectomy or spinal fusion for the sole purposes of treating back/leg pain in the absence of a compressive lesion or neurological issue is associated with poor outcomes.   \par \par I have reviewed the images in detail with the patient today in my office and have answered all questions regarding this condition to the best of my ability to the patient’s satisfaction.\par \par He may be a candidate for a spinal stimulator.  I do not provide this service.  \par \par

## 2019-12-09 ENCOUNTER — APPOINTMENT (OUTPATIENT)
Dept: FAMILY MEDICINE | Facility: CLINIC | Age: 79
End: 2019-12-09
Payer: MEDICARE

## 2019-12-09 ENCOUNTER — APPOINTMENT (OUTPATIENT)
Dept: PULMONOLOGY | Facility: CLINIC | Age: 79
End: 2019-12-09

## 2019-12-09 VITALS
WEIGHT: 222 LBS | BODY MASS INDEX: 40.85 KG/M2 | SYSTOLIC BLOOD PRESSURE: 128 MMHG | DIASTOLIC BLOOD PRESSURE: 70 MMHG | RESPIRATION RATE: 18 BRPM | OXYGEN SATURATION: 95 % | HEIGHT: 62 IN | TEMPERATURE: 97.9 F | HEART RATE: 71 BPM

## 2019-12-09 DIAGNOSIS — R68.89 OTHER GENERAL SYMPTOMS AND SIGNS: ICD-10-CM

## 2019-12-09 PROCEDURE — 99214 OFFICE O/P EST MOD 30 MIN: CPT

## 2019-12-09 NOTE — PHYSICAL EXAM
[Normal Sclera/Conjunctiva] : normal sclera/conjunctiva [Normal] : no acute distress, well nourished, well developed and well-appearing [No Accessory Muscle Use] : no accessory muscle use [No Respiratory Distress] : no respiratory distress  [Normal Outer Ear/Nose] : the outer ears and nose were normal in appearance [Alert and Oriented x3] : oriented to person, place, and time [Non-distended] : non-distended [de-identified] : ambulates with cane

## 2019-12-10 ENCOUNTER — APPOINTMENT (OUTPATIENT)
Dept: PULMONOLOGY | Facility: CLINIC | Age: 79
End: 2019-12-10
Payer: MEDICARE

## 2019-12-10 VITALS — SYSTOLIC BLOOD PRESSURE: 125 MMHG | HEART RATE: 82 BPM | OXYGEN SATURATION: 90 % | DIASTOLIC BLOOD PRESSURE: 70 MMHG

## 2019-12-10 DIAGNOSIS — Z87.898 PERSONAL HISTORY OF OTHER SPECIFIED CONDITIONS: ICD-10-CM

## 2019-12-10 PROCEDURE — 99204 OFFICE O/P NEW MOD 45 MIN: CPT | Mod: 25

## 2019-12-10 PROCEDURE — 94664 DEMO&/EVAL PT USE INHALER: CPT | Mod: 59

## 2019-12-10 PROCEDURE — 94060 EVALUATION OF WHEEZING: CPT

## 2019-12-10 NOTE — CONSULT LETTER
[Dear  ___] : Dear  [unfilled], [Consult Letter:] : I had the pleasure of evaluating your patient, [unfilled]. [Please see my note below.] : Please see my note below. [Consult Closing:] : Thank you very much for allowing me to participate in the care of this patient.  If you have any questions, please do not hesitate to contact me. [Sincerely,] : Sincerely, [DrBlessing  ___] : Dr. LOFTON

## 2019-12-16 DIAGNOSIS — Z60.2 PROBLEMS RELATED TO LIVING ALONE: ICD-10-CM

## 2019-12-16 DIAGNOSIS — Z63.4 DISAPPEARANCE AND DEATH OF FAMILY MEMBER: ICD-10-CM

## 2019-12-16 SDOH — SOCIAL STABILITY - SOCIAL INSECURITY: PROBLEMS RELATED TO LIVING ALONE: Z60.2

## 2019-12-16 SDOH — SOCIAL STABILITY - SOCIAL INSECURITY: DISSAPEARANCE AND DEATH OF FAMILY MEMBER: Z63.4

## 2019-12-17 ENCOUNTER — APPOINTMENT (OUTPATIENT)
Dept: CHRONIC DISEASE MANAGEMENT | Facility: CLINIC | Age: 79
End: 2019-12-17
Payer: MEDICARE

## 2019-12-17 VITALS — WEIGHT: 225 LBS | HEIGHT: 62 IN | BODY MASS INDEX: 41.41 KG/M2

## 2019-12-17 PROCEDURE — G0108 DIAB MANAGE TRN  PER INDIV: CPT

## 2019-12-18 PROBLEM — Z60.2 LIVING ALONE: Status: ACTIVE | Noted: 2019-12-18

## 2019-12-18 PROBLEM — Z63.4 WIDOWED: Status: ACTIVE | Noted: 2019-12-18

## 2020-01-09 ENCOUNTER — APPOINTMENT (OUTPATIENT)
Dept: FAMILY MEDICINE | Facility: CLINIC | Age: 80
End: 2020-01-09
Payer: MEDICARE

## 2020-01-09 VITALS
DIASTOLIC BLOOD PRESSURE: 62 MMHG | HEIGHT: 62 IN | SYSTOLIC BLOOD PRESSURE: 122 MMHG | BODY MASS INDEX: 41.04 KG/M2 | OXYGEN SATURATION: 97 % | HEART RATE: 71 BPM | WEIGHT: 223 LBS

## 2020-01-09 PROCEDURE — 99214 OFFICE O/P EST MOD 30 MIN: CPT | Mod: 25

## 2020-01-09 PROCEDURE — 36415 COLL VENOUS BLD VENIPUNCTURE: CPT

## 2020-01-09 NOTE — HISTORY OF PRESENT ILLNESS
[FreeTextEntry1] : patient is here for a follow up  [de-identified] : 79-year-old male who presents to followup \par DM - controlled; reports compliance with medication; seen by dietician - reports weight loss\par hypertension - controlled compliant with medication.\par Neuropathy - controlled, follows with neuro (Dr. Krishnan - next appt 2/27/20).\par

## 2020-01-09 NOTE — ASSESSMENT
[FreeTextEntry1] : RTO in 3 months.\par \par All urine and blood specimens collected in office today.\par

## 2020-01-09 NOTE — PHYSICAL EXAM
[Normal Sclera/Conjunctiva] : normal sclera/conjunctiva [Normal Outer Ear/Nose] : the outer ears and nose were normal in appearance [No Respiratory Distress] : no respiratory distress  [No Accessory Muscle Use] : no accessory muscle use [Normal Rate] : normal rate  [Normal S1, S2] : normal S1 and S2 [Normal] : no posterior cervical lymphadenopathy and no anterior cervical lymphadenopathy [Non-distended] : non-distended [No Rash] : no rash [Coordination Grossly Intact] : coordination grossly intact [Normal Gait] : normal gait [Alert and Oriented x3] : oriented to person, place, and time

## 2020-01-10 LAB
ALBUMIN SERPL ELPH-MCNC: 4.4 G/DL
ALP BLD-CCNC: 74 U/L
ALT SERPL-CCNC: 27 U/L
ANION GAP SERPL CALC-SCNC: 15 MMOL/L
AST SERPL-CCNC: 20 U/L
BASOPHILS # BLD AUTO: 0.06 K/UL
BASOPHILS NFR BLD AUTO: 0.8 %
BILIRUB SERPL-MCNC: 0.3 MG/DL
BUN SERPL-MCNC: 21 MG/DL
CALCIUM SERPL-MCNC: 9.7 MG/DL
CHLORIDE SERPL-SCNC: 100 MMOL/L
CHOLEST SERPL-MCNC: 116 MG/DL
CHOLEST/HDLC SERPL: 3.2 RATIO
CO2 SERPL-SCNC: 27 MMOL/L
CREAT SERPL-MCNC: 0.92 MG/DL
CREAT SPEC-SCNC: 79 MG/DL
EOSINOPHIL # BLD AUTO: 0.22 K/UL
EOSINOPHIL NFR BLD AUTO: 3.1 %
ESTIMATED AVERAGE GLUCOSE: 143 MG/DL
GLUCOSE SERPL-MCNC: 139 MG/DL
HBA1C MFR BLD HPLC: 6.6 %
HCT VFR BLD CALC: 46.5 %
HDLC SERPL-MCNC: 36 MG/DL
HGB BLD-MCNC: 14.3 G/DL
IMM GRANULOCYTES NFR BLD AUTO: 0.4 %
LDLC SERPL CALC-MCNC: 41 MG/DL
LYMPHOCYTES # BLD AUTO: 1.33 K/UL
LYMPHOCYTES NFR BLD AUTO: 18.8 %
MAN DIFF?: NORMAL
MCHC RBC-ENTMCNC: 26.6 PG
MCHC RBC-ENTMCNC: 30.8 GM/DL
MCV RBC AUTO: 86.6 FL
MICROALBUMIN 24H UR DL<=1MG/L-MCNC: 1.2 MG/DL
MICROALBUMIN/CREAT 24H UR-RTO: 15 MG/G
MONOCYTES # BLD AUTO: 0.62 K/UL
MONOCYTES NFR BLD AUTO: 8.7 %
NEUTROPHILS # BLD AUTO: 4.83 K/UL
NEUTROPHILS NFR BLD AUTO: 68.2 %
PLATELET # BLD AUTO: 246 K/UL
POTASSIUM SERPL-SCNC: 4.5 MMOL/L
PROT SERPL-MCNC: 7.2 G/DL
RBC # BLD: 5.37 M/UL
RBC # FLD: 17.3 %
SODIUM SERPL-SCNC: 142 MMOL/L
TRIGL SERPL-MCNC: 197 MG/DL
WBC # FLD AUTO: 7.09 K/UL

## 2020-01-16 ENCOUNTER — RX RENEWAL (OUTPATIENT)
Age: 80
End: 2020-01-16

## 2020-01-22 ENCOUNTER — APPOINTMENT (OUTPATIENT)
Dept: DERMATOLOGY | Facility: CLINIC | Age: 80
End: 2020-01-22
Payer: MEDICARE

## 2020-01-22 DIAGNOSIS — L24.9 IRRITANT CONTACT DERMATITIS, UNSPECIFIED CAUSE: ICD-10-CM

## 2020-01-22 DIAGNOSIS — L60.3 NAIL DYSTROPHY: ICD-10-CM

## 2020-01-22 PROCEDURE — 99202 OFFICE O/P NEW SF 15 MIN: CPT

## 2020-01-22 RX ORDER — AMMONIUM LACTATE 12 %
12 CREAM (GRAM) TOPICAL
Qty: 1 | Refills: 1 | Status: ACTIVE | COMMUNITY
Start: 2020-01-22 | End: 1900-01-01

## 2020-01-22 NOTE — CONSULT LETTER
[Dear  ___] : Dear  [unfilled], [Consult Letter:] : I had the pleasure of evaluating your patient, [unfilled]. [Consult Closing:] : Thank you very much for allowing me to participate in the care of this patient.  If you have any questions, please do not hesitate to contact me. [Sincerely,] : Sincerely, [FreeTextEntry2] :  Ana Herrera, DO [FreeTextEntry1] : Vision is a few year history of "soft nails". This is a very difficult problem to treat.\par Patient also has an irritant dermatitis on the fingertips.\par \par Please see attached chart note for further details and treatment plan. [FreeTextEntry3] : Jaquan Mancia MD\par [address], [address]\par [place] [address]\par Tel ([phone])\par Fax ([phone])\par Private line ([phone])\par

## 2020-01-22 NOTE — PHYSICAL EXAM
[Alert] : alert [Oriented x 3] : ~L oriented x 3 [Well Nourished] : well nourished [FreeTextEntry3] : Type II skin\par \par Hands: Diffuse ill-defined erythema and scaling present on the distal fingertips and paronychial areas\par Several cuticles are ragged and scaly - few crusts present\par Nails are cut short\par Nail plates are within normal limits with several vertical striations\par No splits present

## 2020-01-22 NOTE — ASSESSMENT
[FreeTextEntry1] : Soft nails by history although nail plates appear within normal limits for someone his age\par Irritant dermatitis/paronychia present around the nails

## 2020-01-22 NOTE — HISTORY OF PRESENT ILLNESS
[FreeTextEntry1] : Soft nails [de-identified] : First visit for this 79-year-old white male referred by Ana Herrera DO, with a few year history of "soft nails".  No previous treatment.\par Toenails apparently unaffected.

## 2020-02-03 NOTE — PHYSICAL THERAPY INITIAL EVALUATION ADULT - KINESTHESIA,  LLE, OT EVAL
Patient reports he has not heard anything from Dr. Bowie's office.  Results were discussed today in office.  It was my understanding through previous telephone encounters that Dr. Bowie would review the study and would refer the patient elsewhere.  He does have an abnormal study.  It this something that I need to address and send elsewhere?   within normal limits

## 2020-02-10 ENCOUNTER — APPOINTMENT (OUTPATIENT)
Dept: FAMILY MEDICINE | Facility: CLINIC | Age: 80
End: 2020-02-10

## 2020-02-11 ENCOUNTER — APPOINTMENT (OUTPATIENT)
Dept: CHRONIC DISEASE MANAGEMENT | Facility: CLINIC | Age: 80
End: 2020-02-11
Payer: MEDICARE

## 2020-02-11 VITALS — BODY MASS INDEX: 41.04 KG/M2 | WEIGHT: 223 LBS | HEIGHT: 62 IN

## 2020-02-11 PROCEDURE — G0108 DIAB MANAGE TRN  PER INDIV: CPT

## 2020-02-12 ENCOUNTER — APPOINTMENT (OUTPATIENT)
Dept: FAMILY MEDICINE | Facility: CLINIC | Age: 80
End: 2020-02-12
Payer: MEDICARE

## 2020-02-12 VITALS
OXYGEN SATURATION: 97 % | SYSTOLIC BLOOD PRESSURE: 124 MMHG | TEMPERATURE: 98.5 F | WEIGHT: 219 LBS | BODY MASS INDEX: 40.3 KG/M2 | HEART RATE: 67 BPM | DIASTOLIC BLOOD PRESSURE: 60 MMHG | HEIGHT: 62 IN

## 2020-02-12 DIAGNOSIS — Z87.891 PERSONAL HISTORY OF NICOTINE DEPENDENCE: ICD-10-CM

## 2020-02-12 PROCEDURE — 99214 OFFICE O/P EST MOD 30 MIN: CPT

## 2020-02-12 RX ORDER — ALBUTEROL SULFATE 90 UG/1
108 (90 BASE) AEROSOL, METERED RESPIRATORY (INHALATION)
Qty: 1 | Refills: 2 | Status: ACTIVE | COMMUNITY
Start: 2020-02-12 | End: 1900-01-01

## 2020-02-12 NOTE — HEALTH RISK ASSESSMENT
[] : No [No] : No [0] : 2) Feeling down, depressed, or hopeless: Not at all (0) [de-identified] : pulm endo neuro  [[id number]] : 0

## 2020-02-12 NOTE — ASSESSMENT
[FreeTextEntry1] : imp: 81 y/o M seen today for recurrent Dyspnea on exertion with mild copd poorly adherent to long acting management (misses evening dose of Symbicort . \par plan: \par we discussed ways to remember the second dose and started rescue inhaler Ventolin \par advised to rinse your mouth with water after use. \par f/u in one month \par Return or seek medical attention if symptoms return or worsen.

## 2020-02-12 NOTE — COUNSELING
[Fall prevention counseling provided] : Fall prevention counseling provided [Use proper foot wear] : Use proper foot wear [No throw rugs] : No throw rugs [Adequate lighting] : Adequate lighting [Behavioral health counseling provided] : Behavioral health counseling provided [Use recommended devices] : Use recommended devices [Engage in a relaxing activity] : Engage in a relaxing activity [Sleep ___ hours/day] : Sleep [unfilled] hours/day [Weigh Self Weekly] : weigh self weekly [Decrease Portions] : decrease portions [Plan in advance] : Plan in advance [Other: ____] : [unfilled] [Needs reinforcement, provided] : Patient needs reinforcement on understanding of lifestyle changes and steps needed to achieve self management goal; reinforcement was provided

## 2020-02-12 NOTE — PHYSICAL EXAM
[No Acute Distress] : no acute distress [Well Nourished] : well nourished [Well Developed] : well developed [Well-Appearing] : well-appearing [Normal Sclera/Conjunctiva] : normal sclera/conjunctiva [PERRL] : pupils equal round and reactive to light [Normal Outer Ear/Nose] : the outer ears and nose were normal in appearance [EOMI] : extraocular movements intact [No JVD] : no jugular venous distention [Normal Oropharynx] : the oropharynx was normal [No Lymphadenopathy] : no lymphadenopathy [Thyroid Normal, No Nodules] : the thyroid was normal and there were no nodules present [Supple] : supple [No Accessory Muscle Use] : no accessory muscle use [No Respiratory Distress] : no respiratory distress  [Clear to Auscultation] : lungs were clear to auscultation bilaterally [Normal Rate] : normal rate  [Normal S1, S2] : normal S1 and S2 [Regular Rhythm] : with a regular rhythm [No Carotid Bruits] : no carotid bruits [No Murmur] : no murmur heard [No Varicosities] : no varicosities [No Abdominal Bruit] : a ~M bruit was not heard ~T in the abdomen [Pedal Pulses Present] : the pedal pulses are present [No Palpable Aorta] : no palpable aorta [No Edema] : there was no peripheral edema [No Extremity Clubbing/Cyanosis] : no extremity clubbing/cyanosis [Soft] : abdomen soft [Non Tender] : non-tender [Non-distended] : non-distended [No Masses] : no abdominal mass palpated [Normal Bowel Sounds] : normal bowel sounds [No HSM] : no HSM [Normal Anterior Cervical Nodes] : no anterior cervical lymphadenopathy [Normal Posterior Cervical Nodes] : no posterior cervical lymphadenopathy [No Spinal Tenderness] : no spinal tenderness [No CVA Tenderness] : no CVA  tenderness [No Joint Swelling] : no joint swelling [Grossly Normal Strength/Tone] : grossly normal strength/tone [No Rash] : no rash [Coordination Grossly Intact] : coordination grossly intact [Normal Gait] : normal gait [No Focal Deficits] : no focal deficits [Normal Insight/Judgement] : insight and judgment were intact [Deep Tendon Reflexes (DTR)] : deep tendon reflexes were 2+ and symmetric [Normal Affect] : the affect was normal

## 2020-02-12 NOTE — REVIEW OF SYSTEMS
[Fever] : no fever [Chills] : no chills [Fatigue] : no fatigue [Night Sweats] : no night sweats [Recent Change In Weight] : ~T no recent weight change [Discharge] : no discharge [Pain] : no pain [Redness] : no redness [Vision Problems] : no vision problems [Hearing Loss] : no hearing loss [Itching] : no itching [Earache] : no earache [Postnasal Drip] : no postnasal drip [Nosebleeds] : no nosebleeds [Sore Throat] : no sore throat [Nasal Discharge] : no nasal discharge [Chest Pain] : no chest pain [Hoarseness] : no hoarseness [Claudication] : no  leg claudication [Lower Ext Edema] : no lower extremity edema [Palpitations] : no palpitations [Orthopena] : no orthopnea [Paroxysmal Nocturnal Dyspnea] : no paroxysmal nocturnal dyspnea [Shortness Of Breath] : no shortness of breath [Cough] : no cough [Wheezing] : no wheezing [Abdominal Pain] : no abdominal pain [Dyspnea on Exertion] : dyspnea on exertion [Constipation] : no constipation [Diarrhea] : no diarrhea [Nausea] : no nausea [Heartburn] : no heartburn [Vomiting] : no vomiting [Melena] : no melena [Headache] : no headache [Dizziness] : no dizziness [Fainting] : no fainting [Unsteady Walk] : no ataxia [Confusion] : no confusion [Memory Loss] : no memory loss

## 2020-02-12 NOTE — HISTORY OF PRESENT ILLNESS
[FreeTextEntry8] : c/o SOB a couple times a day during excretion this is an ongoing issue and he dose f/u with pulm for this issue on Symbicourt misses evening dose and denies having rescue inhaler . \par

## 2020-02-27 ENCOUNTER — APPOINTMENT (OUTPATIENT)
Dept: DERMATOLOGY | Facility: CLINIC | Age: 80
End: 2020-02-27

## 2020-03-11 ENCOUNTER — APPOINTMENT (OUTPATIENT)
Dept: FAMILY MEDICINE | Facility: CLINIC | Age: 80
End: 2020-03-11
Payer: MEDICARE

## 2020-03-11 VITALS
SYSTOLIC BLOOD PRESSURE: 102 MMHG | BODY MASS INDEX: 40.67 KG/M2 | TEMPERATURE: 97.9 F | OXYGEN SATURATION: 99 % | HEART RATE: 73 BPM | HEIGHT: 62 IN | DIASTOLIC BLOOD PRESSURE: 60 MMHG | WEIGHT: 221 LBS

## 2020-03-11 PROCEDURE — 99213 OFFICE O/P EST LOW 20 MIN: CPT

## 2020-03-11 NOTE — PHYSICAL EXAM
[No Acute Distress] : no acute distress [Well Nourished] : well nourished [Well Developed] : well developed [Well-Appearing] : well-appearing [Normal Sclera/Conjunctiva] : normal sclera/conjunctiva [PERRL] : pupils equal round and reactive to light [EOMI] : extraocular movements intact [Normal Outer Ear/Nose] : the outer ears and nose were normal in appearance [Normal Oropharynx] : the oropharynx was normal [No JVD] : no jugular venous distention [No Lymphadenopathy] : no lymphadenopathy [Supple] : supple [Thyroid Normal, No Nodules] : the thyroid was normal and there were no nodules present [No Respiratory Distress] : no respiratory distress  [No Accessory Muscle Use] : no accessory muscle use [Clear to Auscultation] : lungs were clear to auscultation bilaterally [Normal Rate] : normal rate  [Regular Rhythm] : with a regular rhythm [Normal S1, S2] : normal S1 and S2 [No Murmur] : no murmur heard [No Carotid Bruits] : no carotid bruits [No Abdominal Bruit] : a ~M bruit was not heard ~T in the abdomen [No Varicosities] : no varicosities [Pedal Pulses Present] : the pedal pulses are present [No Edema] : there was no peripheral edema [No Palpable Aorta] : no palpable aorta [No Extremity Clubbing/Cyanosis] : no extremity clubbing/cyanosis [Soft] : abdomen soft [Non Tender] : non-tender [Non-distended] : non-distended [No Masses] : no abdominal mass palpated [No HSM] : no HSM [Normal Bowel Sounds] : normal bowel sounds [Normal Posterior Cervical Nodes] : no posterior cervical lymphadenopathy [Normal Anterior Cervical Nodes] : no anterior cervical lymphadenopathy [No CVA Tenderness] : no CVA  tenderness [No Spinal Tenderness] : no spinal tenderness [No Joint Swelling] : no joint swelling [Grossly Normal Strength/Tone] : grossly normal strength/tone [No Rash] : no rash [Coordination Grossly Intact] : coordination grossly intact [No Focal Deficits] : no focal deficits [Normal Gait] : normal gait [Deep Tendon Reflexes (DTR)] : deep tendon reflexes were 2+ and symmetric [Normal Affect] : the affect was normal [Normal Insight/Judgement] : insight and judgment were intact [de-identified] : obese

## 2020-03-11 NOTE — PHYSICAL EXAM
[No Acute Distress] : no acute distress [Well Nourished] : well nourished [Well Developed] : well developed [Well-Appearing] : well-appearing [Normal Sclera/Conjunctiva] : normal sclera/conjunctiva [PERRL] : pupils equal round and reactive to light [EOMI] : extraocular movements intact [Normal Outer Ear/Nose] : the outer ears and nose were normal in appearance [Normal Oropharynx] : the oropharynx was normal [No JVD] : no jugular venous distention [No Lymphadenopathy] : no lymphadenopathy [Supple] : supple [Thyroid Normal, No Nodules] : the thyroid was normal and there were no nodules present [No Respiratory Distress] : no respiratory distress  [No Accessory Muscle Use] : no accessory muscle use [Clear to Auscultation] : lungs were clear to auscultation bilaterally [Normal Rate] : normal rate  [Regular Rhythm] : with a regular rhythm [Normal S1, S2] : normal S1 and S2 [No Murmur] : no murmur heard [No Carotid Bruits] : no carotid bruits [No Abdominal Bruit] : a ~M bruit was not heard ~T in the abdomen [No Varicosities] : no varicosities [Pedal Pulses Present] : the pedal pulses are present [No Edema] : there was no peripheral edema [No Palpable Aorta] : no palpable aorta [No Extremity Clubbing/Cyanosis] : no extremity clubbing/cyanosis [Soft] : abdomen soft [Non Tender] : non-tender [Non-distended] : non-distended [No Masses] : no abdominal mass palpated [No HSM] : no HSM [Normal Bowel Sounds] : normal bowel sounds [Normal Posterior Cervical Nodes] : no posterior cervical lymphadenopathy [Normal Anterior Cervical Nodes] : no anterior cervical lymphadenopathy [No CVA Tenderness] : no CVA  tenderness [No Spinal Tenderness] : no spinal tenderness [No Joint Swelling] : no joint swelling [Grossly Normal Strength/Tone] : grossly normal strength/tone [No Rash] : no rash [Coordination Grossly Intact] : coordination grossly intact [No Focal Deficits] : no focal deficits [Normal Gait] : normal gait [Deep Tendon Reflexes (DTR)] : deep tendon reflexes were 2+ and symmetric [Normal Affect] : the affect was normal [Normal Insight/Judgement] : insight and judgment were intact [de-identified] : obese

## 2020-03-11 NOTE — COUNSELING
[Fall prevention counseling provided] : Fall prevention counseling provided [Adequate lighting] : Adequate lighting [No throw rugs] : No throw rugs [Use recommended devices] : Use recommended devices [None] : None [Good understanding] : Patient has a good understanding of lifestyle changes and steps needed to achieve self management goal

## 2020-03-11 NOTE — HISTORY OF PRESENT ILLNESS
[FreeTextEntry1] : Patient is here for a follow up 2/2 to COPD  [de-identified] : last visit we worked on adherence to Symbicort and started rescuer inhaler  he now said he is breathing much better with less LARA using rescue 1 to 2 x per day 5/7 days still not ideal but improved. he is followed by Pulm and said he has an upcoming apt .

## 2020-03-11 NOTE — HISTORY OF PRESENT ILLNESS
[FreeTextEntry1] : Patient is here for a follow up 2/2 to COPD  [de-identified] : last visit we worked on adherence to Symbicort and started rescuer inhaler  he now said he is breathing much better with less LARA using rescue 1 to 2 x per day 5/7 days still not ideal but improved. he is followed by Pulm and said he has an upcoming apt .

## 2020-04-28 ENCOUNTER — APPOINTMENT (OUTPATIENT)
Dept: PULMONOLOGY | Facility: CLINIC | Age: 80
End: 2020-04-28

## 2020-05-04 ENCOUNTER — APPOINTMENT (OUTPATIENT)
Dept: FAMILY MEDICINE | Facility: CLINIC | Age: 80
End: 2020-05-04
Payer: MEDICARE

## 2020-05-04 VITALS
WEIGHT: 224 LBS | TEMPERATURE: 98.3 F | HEART RATE: 88 BPM | BODY MASS INDEX: 41.22 KG/M2 | DIASTOLIC BLOOD PRESSURE: 70 MMHG | OXYGEN SATURATION: 90 % | SYSTOLIC BLOOD PRESSURE: 140 MMHG | HEIGHT: 62 IN

## 2020-05-04 DIAGNOSIS — R06.2 WHEEZING: ICD-10-CM

## 2020-05-04 DIAGNOSIS — R06.89 DYSPNEA, UNSPECIFIED: ICD-10-CM

## 2020-05-04 DIAGNOSIS — R06.00 DYSPNEA, UNSPECIFIED: ICD-10-CM

## 2020-05-04 PROCEDURE — 36415 COLL VENOUS BLD VENIPUNCTURE: CPT

## 2020-05-04 PROCEDURE — 99214 OFFICE O/P EST MOD 30 MIN: CPT | Mod: 25

## 2020-05-05 NOTE — REVIEW OF SYSTEMS
[Fever] : no fever [Chills] : no chills [Fatigue] : fatigue [Hot Flashes] : no hot flashes [Night Sweats] : no night sweats [Recent Change In Weight] : ~T no recent weight change [Chest Pain] : no chest pain [Palpitations] : no palpitations [Claudication] : no  leg claudication [Lower Ext Edema] : lower extremity edema [Orthopena] : no orthopnea [Paroxysmal Nocturnal Dyspnea] : no paroxysmal nocturnal dyspnea [Shortness Of Breath] : shortness of breath [Wheezing] : no wheezing [Cough] : cough [Dyspnea on Exertion] : dyspnea on exertion [Negative] : Genitourinary

## 2020-05-05 NOTE — PLAN
[FreeTextEntry1] : DC symbicort. Start Breo\par Will call in a few days with esults and plan.\par Will then refer for cardiac work up

## 2020-05-05 NOTE — HISTORY OF PRESENT ILLNESS
[FreeTextEntry1] : patient is here for a follow up and blood work  [de-identified] : Pt hx reviewed.\par Main complaint is labored breathing with activity , feels its secondary to his wt but never had cardia work up. Has seen pulmonary for copd\par \par Claims symbicort is not working, uses ventolin prn

## 2020-05-05 NOTE — PHYSICAL EXAM
[No Acute Distress] : no acute distress [No JVD] : no jugular venous distention [No Lymphadenopathy] : no lymphadenopathy [No Respiratory Distress] : no respiratory distress  [No Accessory Muscle Use] : no accessory muscle use [Normal Rate] : normal rate  [Regular Rhythm] : with a regular rhythm [Normal S1, S2] : normal S1 and S2 [No Murmur] : no murmur heard [No Carotid Bruits] : no carotid bruits [de-identified] : obese [de-identified] : Decreased BS bilat [de-identified] : tr edema ankles

## 2020-05-07 NOTE — PHYSICAL EXAM
[Normal Conjunctiva] : the conjunctiva exhibited no abnormalities [Elongated Uvula] : elongated uvula [Enlarged Base of the Tongue] : enlargement of the base of the tongue [III] : III [Neck Appearance] : the appearance of the neck was normal [Jugular Venous Distention Increased] : there was no jugular-venous distention [Neck Cervical Mass (___cm)] : no neck mass was observed [Thyroid Diffuse Enlargement] : the thyroid was not enlarged [Neck Circumference: ___] : neck circumference is [unfilled] [Heart Sounds] : normal S1 and S2 [Arterial Pulses Normal] : the arterial pulses were normal [Edema] : no peripheral edema present [Respiration, Rhythm And Depth] : normal respiratory rhythm and effort [Auscultation Breath Sounds / Voice Sounds] : lungs were clear to auscultation bilaterally [Lungs Percussion] : the lungs were normal to percussion [Bowel Sounds] : normal bowel sounds [Abdomen Soft] : soft [Abdomen Tenderness] : non-tender [Abnormal Walk] : normal gait [Skin Turgor] : normal skin turgor [Cyanosis, Localized] : no localized cyanosis [Nail Clubbing] : no clubbing of the fingernails [No Focal Deficits] : no focal deficits [] : no rash [Oriented To Time, Place, And Person] : oriented to person, place, and time [Impaired Insight] : insight and judgment were intact [Memory Recent] : recent memory was not impaired [Affect] : the affect was normal [FreeTextEntry1] : obese

## 2020-05-07 NOTE — ASSESSMENT
[FreeTextEntry1] : Obesity \par GERD\par Mild COPD\par Severe MUMTAZ\par Compliant with and benefiting from nocturnal nasal CPAP

## 2020-05-08 ENCOUNTER — APPOINTMENT (OUTPATIENT)
Dept: PULMONOLOGY | Facility: CLINIC | Age: 80
End: 2020-05-08
Payer: MEDICARE

## 2020-05-08 VITALS — WEIGHT: 225 LBS | HEIGHT: 62 IN | BODY MASS INDEX: 41.41 KG/M2

## 2020-05-08 PROCEDURE — 99214 OFFICE O/P EST MOD 30 MIN: CPT | Mod: 95

## 2020-05-08 RX ORDER — BUDESONIDE AND FORMOTEROL FUMARATE DIHYDRATE 160; 4.5 UG/1; UG/1
160-4.5 AEROSOL RESPIRATORY (INHALATION)
Refills: 0 | Status: DISCONTINUED | COMMUNITY
Start: 2020-02-12 | End: 2020-05-08

## 2020-05-08 NOTE — COUNSELING
[Potential consequences of obesity discussed] : Potential consequences of obesity discussed [Benefits of weight loss discussed] : Benefits of weight loss discussed [Structured Weight Management Program suggested:] : Structured weight management program suggested [Encouraged to maintain food diary] : Encouraged to maintain food diary [Encouraged to increase physical activity] : Encouraged to increase physical activity

## 2020-05-08 NOTE — CONSULT LETTER
[Dear  ___] : Dear  [unfilled], [Please see my note below.] : Please see my note below. [Consult Letter:] : I had the pleasure of evaluating your patient, [unfilled]. [Consult Closing:] : Thank you very much for allowing me to participate in the care of this patient.  If you have any questions, please do not hesitate to contact me. [Sincerely,] : Sincerely, [DrBlessing  ___] : Dr. LOFTON

## 2020-05-08 NOTE — HISTORY OF PRESENT ILLNESS
[Home] : at home, [unfilled] , at the time of the visit. [Medical Office: ([facility])___] : at the medical office located in  [Patient] : the patient [Self] : self [FreeTextEntry1] : Obesity\par GERD\par MUMTAZ\par Changing doctors after a move - Dr Beckett now\par \par Using cpap every day \par No snoring\par Refreshed each am

## 2020-05-26 LAB
ALBUMIN SERPL ELPH-MCNC: 4.2 G/DL
ALP BLD-CCNC: 87 U/L
ALT SERPL-CCNC: 20 U/L
ANION GAP SERPL CALC-SCNC: 15 MMOL/L
AST SERPL-CCNC: 18 U/L
BASOPHILS # BLD AUTO: 0.06 K/UL
BASOPHILS NFR BLD AUTO: 0.9 %
BILIRUB SERPL-MCNC: <0.2 MG/DL
BUN SERPL-MCNC: 22 MG/DL
CALCIUM SERPL-MCNC: 9.2 MG/DL
CHLORIDE SERPL-SCNC: 102 MMOL/L
CHOLEST SERPL-MCNC: 111 MG/DL
CHOLEST/HDLC SERPL: 3.4 RATIO
CO2 SERPL-SCNC: 25 MMOL/L
CREAT SERPL-MCNC: 1.05 MG/DL
EOSINOPHIL # BLD AUTO: 0.39 K/UL
EOSINOPHIL NFR BLD AUTO: 5.6 %
ESTIMATED AVERAGE GLUCOSE: 148 MG/DL
GLUCOSE SERPL-MCNC: 168 MG/DL
HBA1C MFR BLD HPLC: 6.8 %
HCT VFR BLD CALC: 43.3 %
HDLC SERPL-MCNC: 33 MG/DL
HGB BLD-MCNC: 13.2 G/DL
IMM GRANULOCYTES NFR BLD AUTO: 0.9 %
LDLC SERPL CALC-MCNC: 43 MG/DL
LYMPHOCYTES # BLD AUTO: 1.21 K/UL
LYMPHOCYTES NFR BLD AUTO: 17.4 %
MAN DIFF?: NORMAL
MCHC RBC-ENTMCNC: 26.1 PG
MCHC RBC-ENTMCNC: 30.5 GM/DL
MCV RBC AUTO: 85.6 FL
MONOCYTES # BLD AUTO: 0.47 K/UL
MONOCYTES NFR BLD AUTO: 6.7 %
NEUTROPHILS # BLD AUTO: 4.78 K/UL
NEUTROPHILS NFR BLD AUTO: 68.5 %
PLATELET # BLD AUTO: 221 K/UL
POTASSIUM SERPL-SCNC: 4.7 MMOL/L
PROT SERPL-MCNC: 6.8 G/DL
PSA SERPL-MCNC: 0.07 NG/ML
RBC # BLD: 5.06 M/UL
RBC # FLD: 16.3 %
SODIUM SERPL-SCNC: 141 MMOL/L
TRIGL SERPL-MCNC: 177 MG/DL
WBC # FLD AUTO: 6.97 K/UL

## 2020-06-10 ENCOUNTER — APPOINTMENT (OUTPATIENT)
Dept: FAMILY MEDICINE | Facility: CLINIC | Age: 80
End: 2020-06-10

## 2020-07-28 ENCOUNTER — RX RENEWAL (OUTPATIENT)
Age: 80
End: 2020-07-28

## 2020-08-02 ENCOUNTER — RX RENEWAL (OUTPATIENT)
Age: 80
End: 2020-08-02

## 2020-08-03 ENCOUNTER — APPOINTMENT (OUTPATIENT)
Dept: FAMILY MEDICINE | Facility: CLINIC | Age: 80
End: 2020-08-03
Payer: MEDICARE

## 2020-08-03 VITALS
BODY MASS INDEX: 41.96 KG/M2 | OXYGEN SATURATION: 93 % | DIASTOLIC BLOOD PRESSURE: 60 MMHG | WEIGHT: 228 LBS | SYSTOLIC BLOOD PRESSURE: 140 MMHG | TEMPERATURE: 98 F | HEART RATE: 73 BPM | HEIGHT: 62 IN

## 2020-08-03 DIAGNOSIS — I87.2 VENOUS INSUFFICIENCY (CHRONIC) (PERIPHERAL): ICD-10-CM

## 2020-08-03 PROCEDURE — 99214 OFFICE O/P EST MOD 30 MIN: CPT | Mod: 25

## 2020-08-03 PROCEDURE — 36410 VNPNXR 3YR/> PHY/QHP DX/THER: CPT

## 2020-08-04 PROBLEM — I87.2 VENOUS INSUFFICIENCY, PERIPHERAL: Status: ACTIVE | Noted: 2020-08-04

## 2020-08-04 LAB
BASOPHILS # BLD AUTO: 0.07 K/UL
BASOPHILS NFR BLD AUTO: 1 %
EOSINOPHIL # BLD AUTO: 0.25 K/UL
EOSINOPHIL NFR BLD AUTO: 3.6 %
ESTIMATED AVERAGE GLUCOSE: 146 MG/DL
HBA1C MFR BLD HPLC: 6.7 %
HCT VFR BLD CALC: 45.4 %
HGB BLD-MCNC: 13.1 G/DL
IMM GRANULOCYTES NFR BLD AUTO: 0.4 %
LYMPHOCYTES # BLD AUTO: 1.31 K/UL
LYMPHOCYTES NFR BLD AUTO: 18.9 %
MAN DIFF?: NORMAL
MCHC RBC-ENTMCNC: 26.3 PG
MCHC RBC-ENTMCNC: 28.9 GM/DL
MCV RBC AUTO: 91.2 FL
MONOCYTES # BLD AUTO: 0.51 K/UL
MONOCYTES NFR BLD AUTO: 7.3 %
NEUTROPHILS # BLD AUTO: 4.77 K/UL
NEUTROPHILS NFR BLD AUTO: 68.8 %
PLATELET # BLD AUTO: 237 K/UL
RBC # BLD: 4.98 M/UL
RBC # FLD: 17.1 %
WBC # FLD AUTO: 6.94 K/UL

## 2020-08-04 NOTE — REVIEW OF SYSTEMS
[Fatigue] : fatigue [Lower Ext Edema] : lower extremity edema [Shortness Of Breath] : shortness of breath [Dyspnea on Exertion] : dyspnea on exertion [Cough] : cough [Joint Stiffness] : joint stiffness [Joint Pain] : joint pain [Muscle Pain] : muscle pain [Muscle Weakness] : muscle weakness [Back Pain] : back pain [Unsteady Walk] : ataxia [Negative] : Psychiatric [Fever] : no fever [Chills] : no chills [Hot Flashes] : no hot flashes [Night Sweats] : no night sweats [Recent Change In Weight] : ~T no recent weight change [Chest Pain] : no chest pain [Palpitations] : no palpitations [Claudication] : no  leg claudication [Orthopena] : no orthopnea [Paroxysmal Nocturnal Dyspnea] : no paroxysmal nocturnal dyspnea [Wheezing] : no wheezing

## 2020-08-04 NOTE — PHYSICAL EXAM
[No Acute Distress] : no acute distress [No JVD] : no jugular venous distention [No Respiratory Distress] : no respiratory distress  [No Lymphadenopathy] : no lymphadenopathy [No Accessory Muscle Use] : no accessory muscle use [Regular Rhythm] : with a regular rhythm [Normal Rate] : normal rate  [No Carotid Bruits] : no carotid bruits [No Murmur] : no murmur heard [Normal S1, S2] : normal S1 and S2 [No Varicosities] : no varicosities [Pedal Pulses Present] : the pedal pulses are present [Normal] : no posterior cervical lymphadenopathy and no anterior cervical lymphadenopathy [de-identified] : obese [de-identified] : Decreased BS bilat [de-identified] : 1+ edema ankles [de-identified] : obese

## 2020-08-04 NOTE — HISTORY OF PRESENT ILLNESS
[FreeTextEntry1] : patient is here for a follow up, both legs are swollen [de-identified] : Pt hx reviewed. LE edema for years\par Main complaint is labored breathing with activity , feels its secondary to his wt but never had cardia work up. Has seen pulmonary for copd\par \par Claims symbicort is not working, uses ventolin prn

## 2020-08-04 NOTE — COUNSELING
[Benefits of weight loss discussed] : Benefits of weight loss discussed [Potential consequences of obesity discussed] : Potential consequences of obesity discussed [Encouraged to maintain food diary] : Encouraged to maintain food diary [Encouraged to increase physical activity] : Encouraged to increase physical activity [Encouraged to use exercise tracking device] : Encouraged to use exercise tracking device [Decrease Portions] : decrease portions [Needs reinforcement, provided] : Patient needs reinforcement on understanding of lifestyle changes and steps needed to achieve self management goal; reinforcement was provided

## 2020-08-05 ENCOUNTER — APPOINTMENT (OUTPATIENT)
Dept: CHRONIC DISEASE MANAGEMENT | Facility: CLINIC | Age: 80
End: 2020-08-05

## 2020-08-18 LAB
ALBUMIN SERPL ELPH-MCNC: 4.4 G/DL
ALP BLD-CCNC: 84 U/L
ALT SERPL-CCNC: 19 U/L
ANION GAP SERPL CALC-SCNC: 13 MMOL/L
AST SERPL-CCNC: 17 U/L
BILIRUB SERPL-MCNC: 0.2 MG/DL
BUN SERPL-MCNC: 22 MG/DL
CALCIUM SERPL-MCNC: 9.5 MG/DL
CHLORIDE SERPL-SCNC: 103 MMOL/L
CHOLEST SERPL-MCNC: 120 MG/DL
CHOLEST/HDLC SERPL: 3.7 RATIO
CO2 SERPL-SCNC: 26 MMOL/L
CREAT SERPL-MCNC: 0.96 MG/DL
GLUCOSE SERPL-MCNC: 164 MG/DL
HDLC SERPL-MCNC: 32 MG/DL
LDLC SERPL CALC-MCNC: 38 MG/DL
POTASSIUM SERPL-SCNC: 4.3 MMOL/L
PROT SERPL-MCNC: 7 G/DL
SODIUM SERPL-SCNC: 141 MMOL/L
TRIGL SERPL-MCNC: 248 MG/DL
TSH SERPL-ACNC: 0.97 UIU/ML

## 2020-09-02 ENCOUNTER — APPOINTMENT (OUTPATIENT)
Dept: CHRONIC DISEASE MANAGEMENT | Facility: CLINIC | Age: 80
End: 2020-09-02

## 2020-09-02 VITALS — WEIGHT: 224 LBS | HEIGHT: 62 IN | BODY MASS INDEX: 41.22 KG/M2

## 2020-09-22 ENCOUNTER — APPOINTMENT (OUTPATIENT)
Dept: UROLOGY | Facility: CLINIC | Age: 80
End: 2020-09-22
Payer: MEDICARE

## 2020-09-22 VITALS
DIASTOLIC BLOOD PRESSURE: 74 MMHG | HEIGHT: 62 IN | HEART RATE: 80 BPM | TEMPERATURE: 97.3 F | SYSTOLIC BLOOD PRESSURE: 134 MMHG | WEIGHT: 224 LBS | BODY MASS INDEX: 41.22 KG/M2

## 2020-09-22 LAB
BILIRUB UR QL STRIP: NEGATIVE
CLARITY UR: CLEAR
COLLECTION METHOD: NORMAL
GLUCOSE UR-MCNC: NEGATIVE
HCG UR QL: 0.2 EU/DL
HGB UR QL STRIP.AUTO: NORMAL
KETONES UR-MCNC: NEGATIVE
LEUKOCYTE ESTERASE UR QL STRIP: NEGATIVE
NITRITE UR QL STRIP: NEGATIVE
PH UR STRIP: 5
PROT UR STRIP-MCNC: NEGATIVE
SP GR UR STRIP: 1.02

## 2020-09-22 PROCEDURE — 81003 URINALYSIS AUTO W/O SCOPE: CPT | Mod: QW

## 2020-09-22 PROCEDURE — 52000 CYSTOURETHROSCOPY: CPT

## 2020-09-25 LAB — URINE CYTOLOGY: NORMAL

## 2020-09-28 ENCOUNTER — APPOINTMENT (OUTPATIENT)
Dept: UROLOGY | Facility: CLINIC | Age: 80
End: 2020-09-28

## 2020-10-07 ENCOUNTER — APPOINTMENT (OUTPATIENT)
Dept: CHRONIC DISEASE MANAGEMENT | Facility: CLINIC | Age: 80
End: 2020-10-07

## 2020-10-07 VITALS — BODY MASS INDEX: 40.85 KG/M2 | HEIGHT: 62 IN | WEIGHT: 222 LBS

## 2020-10-13 ENCOUNTER — RX RENEWAL (OUTPATIENT)
Age: 80
End: 2020-10-13

## 2020-10-13 RX ORDER — OXYBUTYNIN CHLORIDE 5 MG/1
5 TABLET, EXTENDED RELEASE ORAL
Qty: 90 | Refills: 0 | Status: ACTIVE | COMMUNITY
Start: 2019-10-21 | End: 1900-01-01

## 2020-10-23 ENCOUNTER — APPOINTMENT (OUTPATIENT)
Dept: FAMILY MEDICINE | Facility: CLINIC | Age: 80
End: 2020-10-23
Payer: MEDICARE

## 2020-10-23 VITALS
BODY MASS INDEX: 39.75 KG/M2 | TEMPERATURE: 97 F | SYSTOLIC BLOOD PRESSURE: 122 MMHG | HEIGHT: 62 IN | DIASTOLIC BLOOD PRESSURE: 74 MMHG | WEIGHT: 216 LBS

## 2020-10-23 PROCEDURE — 99214 OFFICE O/P EST MOD 30 MIN: CPT | Mod: 25

## 2020-10-27 NOTE — HISTORY OF PRESENT ILLNESS
[FreeTextEntry1] : patient is here for a follow up [de-identified] : Pt hx reviewed. LE edema for years\par Main complaint is labored breathing with activity , feels its secondary to his wt but never had cardia work up. Has seen pulmonary for copd\par \par Claims symbicort is not working, uses ventolin prn

## 2020-10-27 NOTE — PHYSICAL EXAM
[No Acute Distress] : no acute distress [No JVD] : no jugular venous distention [No Lymphadenopathy] : no lymphadenopathy [No Respiratory Distress] : no respiratory distress  [No Accessory Muscle Use] : no accessory muscle use [Normal Rate] : normal rate  [Regular Rhythm] : with a regular rhythm [Normal S1, S2] : normal S1 and S2 [No Murmur] : no murmur heard [No Carotid Bruits] : no carotid bruits [No Varicosities] : no varicosities [Pedal Pulses Present] : the pedal pulses are present [Normal] : affect was normal and insight and judgment were intact [de-identified] : obese [de-identified] : Decreased BS bilat [de-identified] : 1+ edema ankles [de-identified] : obese

## 2020-10-27 NOTE — REVIEW OF SYSTEMS
[Fatigue] : fatigue [Lower Ext Edema] : lower extremity edema [Shortness Of Breath] : shortness of breath [Cough] : cough [Dyspnea on Exertion] : dyspnea on exertion [Joint Pain] : joint pain [Joint Stiffness] : joint stiffness [Muscle Pain] : muscle pain [Muscle Weakness] : muscle weakness [Back Pain] : back pain [Unsteady Walk] : ataxia [Negative] : Psychiatric [Fever] : no fever [Chills] : no chills [Hot Flashes] : no hot flashes [Night Sweats] : no night sweats [Recent Change In Weight] : ~T no recent weight change [Chest Pain] : no chest pain [Palpitations] : no palpitations [Claudication] : no  leg claudication [Orthopena] : no orthopnea [Paroxysmal Nocturnal Dyspnea] : no paroxysmal nocturnal dyspnea [Wheezing] : no wheezing

## 2020-11-01 ENCOUNTER — RX RENEWAL (OUTPATIENT)
Age: 80
End: 2020-11-01

## 2020-11-04 ENCOUNTER — NON-APPOINTMENT (OUTPATIENT)
Age: 80
End: 2020-11-04

## 2020-11-04 VITALS — BODY MASS INDEX: 39.93 KG/M2 | WEIGHT: 217 LBS | HEIGHT: 62 IN

## 2020-11-05 RX ORDER — ROPINIROLE 1 MG/1
1 TABLET, FILM COATED ORAL
Refills: 0 | Status: DISCONTINUED | COMMUNITY
End: 2020-11-05

## 2020-11-09 ENCOUNTER — APPOINTMENT (OUTPATIENT)
Dept: PULMONOLOGY | Facility: CLINIC | Age: 80
End: 2020-11-09
Payer: MEDICARE

## 2020-11-09 VITALS
WEIGHT: 220 LBS | SYSTOLIC BLOOD PRESSURE: 122 MMHG | BODY MASS INDEX: 40.24 KG/M2 | HEART RATE: 75 BPM | DIASTOLIC BLOOD PRESSURE: 70 MMHG | OXYGEN SATURATION: 93 %

## 2020-11-09 DIAGNOSIS — G25.81 RESTLESS LEGS SYNDROME: ICD-10-CM

## 2020-11-09 DIAGNOSIS — G47.33 OBSTRUCTIVE SLEEP APNEA (ADULT) (PEDIATRIC): ICD-10-CM

## 2020-11-09 PROCEDURE — 99214 OFFICE O/P EST MOD 30 MIN: CPT

## 2020-11-09 RX ORDER — PREDNISONE 10 MG/1
10 TABLET ORAL
Qty: 21 | Refills: 0 | Status: DISCONTINUED | COMMUNITY
Start: 2020-10-26

## 2020-11-09 NOTE — HISTORY OF PRESENT ILLNESS
[FreeTextEntry1] : Obesity\par GERD\par MUMTAZ\par Changing doctors after a move - Dr Beckett now\par \par Using cpap every day \par No snoring\par Refreshed each am \par \par 11/9/20\par Happy with CPAP via pillows\par Getting supplies \par Falls asleep in 20 minutes\par Sleep all night \par Uses Breo daily

## 2020-12-02 ENCOUNTER — APPOINTMENT (OUTPATIENT)
Dept: CHRONIC DISEASE MANAGEMENT | Facility: CLINIC | Age: 80
End: 2020-12-02

## 2020-12-02 ENCOUNTER — NON-APPOINTMENT (OUTPATIENT)
Age: 80
End: 2020-12-02

## 2020-12-02 VITALS — BODY MASS INDEX: 40.12 KG/M2 | WEIGHT: 218 LBS | HEIGHT: 62 IN

## 2021-01-06 ENCOUNTER — APPOINTMENT (OUTPATIENT)
Dept: CHRONIC DISEASE MANAGEMENT | Facility: CLINIC | Age: 81
End: 2021-01-06

## 2021-01-06 ENCOUNTER — NON-APPOINTMENT (OUTPATIENT)
Age: 81
End: 2021-01-06

## 2021-01-06 VITALS — HEIGHT: 62 IN | BODY MASS INDEX: 40.67 KG/M2 | WEIGHT: 221 LBS

## 2021-01-25 ENCOUNTER — RX RENEWAL (OUTPATIENT)
Age: 81
End: 2021-01-25

## 2021-02-09 ENCOUNTER — APPOINTMENT (OUTPATIENT)
Dept: CHRONIC DISEASE MANAGEMENT | Facility: CLINIC | Age: 81
End: 2021-02-09

## 2021-02-09 ENCOUNTER — NON-APPOINTMENT (OUTPATIENT)
Age: 81
End: 2021-02-09

## 2021-02-09 VITALS — WEIGHT: 219 LBS | BODY MASS INDEX: 40.3 KG/M2 | HEIGHT: 62 IN

## 2021-03-09 ENCOUNTER — NON-APPOINTMENT (OUTPATIENT)
Age: 81
End: 2021-03-09

## 2021-03-09 ENCOUNTER — APPOINTMENT (OUTPATIENT)
Dept: CHRONIC DISEASE MANAGEMENT | Facility: CLINIC | Age: 81
End: 2021-03-09

## 2021-03-09 VITALS — HEIGHT: 62 IN | BODY MASS INDEX: 40.12 KG/M2 | WEIGHT: 218 LBS

## 2021-04-04 ENCOUNTER — RX RENEWAL (OUTPATIENT)
Age: 81
End: 2021-04-04

## 2021-04-07 ENCOUNTER — APPOINTMENT (OUTPATIENT)
Dept: CHRONIC DISEASE MANAGEMENT | Facility: CLINIC | Age: 81
End: 2021-04-07

## 2021-04-07 ENCOUNTER — NON-APPOINTMENT (OUTPATIENT)
Age: 81
End: 2021-04-07

## 2021-04-07 VITALS — WEIGHT: 217 LBS | HEIGHT: 62 IN | BODY MASS INDEX: 39.93 KG/M2

## 2021-04-12 ENCOUNTER — RX RENEWAL (OUTPATIENT)
Age: 81
End: 2021-04-12

## 2021-04-13 ENCOUNTER — APPOINTMENT (OUTPATIENT)
Dept: FAMILY MEDICINE | Facility: CLINIC | Age: 81
End: 2021-04-13
Payer: MEDICARE

## 2021-04-13 VITALS
HEIGHT: 62 IN | WEIGHT: 219 LBS | DIASTOLIC BLOOD PRESSURE: 80 MMHG | OXYGEN SATURATION: 93 % | HEART RATE: 74 BPM | TEMPERATURE: 97.6 F | BODY MASS INDEX: 40.3 KG/M2 | SYSTOLIC BLOOD PRESSURE: 134 MMHG

## 2021-04-13 PROCEDURE — 36415 COLL VENOUS BLD VENIPUNCTURE: CPT

## 2021-04-13 PROCEDURE — 99214 OFFICE O/P EST MOD 30 MIN: CPT | Mod: 25

## 2021-04-13 RX ORDER — BETAMETHASONE DIPROPIONATE 0.5 MG/G
0.05 CREAM, AUGMENTED TOPICAL
Qty: 50 | Refills: 0 | Status: DISCONTINUED | COMMUNITY
Start: 2021-02-12

## 2021-04-15 NOTE — PHYSICAL EXAM
[No JVD] : no jugular venous distention [No Lymphadenopathy] : no lymphadenopathy [No Respiratory Distress] : no respiratory distress  [No Accessory Muscle Use] : no accessory muscle use [Normal Rate] : normal rate  [Regular Rhythm] : with a regular rhythm [Normal S1, S2] : normal S1 and S2 [No Murmur] : no murmur heard [No Carotid Bruits] : no carotid bruits [No Varicosities] : no varicosities [Pedal Pulses Present] : the pedal pulses are present [Normal] : affect was normal and insight and judgment were intact [de-identified] : obese [de-identified] : Decreased BS bilat [de-identified] : 1+ edema ankles [de-identified] : obese

## 2021-04-15 NOTE — HISTORY OF PRESENT ILLNESS
[FreeTextEntry1] : patient here for follow up [de-identified] : Pt hx reviewed. LE edema for years\par Main complaint is labored breathing with activity , feels its secondary to his wt but never had cardia work up. Has seen pulmonary for copd\par Follow by DM ed and management\par Claims symbicort is not working, uses ventolin prn

## 2021-04-15 NOTE — REVIEW OF SYSTEMS
[Fever] : no fever [Chills] : no chills [Fatigue] : fatigue [Hot Flashes] : no hot flashes [Night Sweats] : no night sweats [Recent Change In Weight] : ~T no recent weight change [Chest Pain] : no chest pain [Palpitations] : no palpitations [Claudication] : no  leg claudication [Lower Ext Edema] : lower extremity edema [Orthopena] : no orthopnea [Paroxysmal Nocturnal Dyspnea] : no paroxysmal nocturnal dyspnea [Shortness Of Breath] : shortness of breath [Wheezing] : no wheezing [Cough] : cough [Dyspnea on Exertion] : dyspnea on exertion [Joint Pain] : joint pain [Joint Stiffness] : joint stiffness [Muscle Pain] : muscle pain [Muscle Weakness] : muscle weakness [Back Pain] : back pain [Unsteady Walk] : ataxia [Negative] : Psychiatric

## 2021-04-19 LAB
ALBUMIN SERPL ELPH-MCNC: 4 G/DL
ALP BLD-CCNC: 92 U/L
ALT SERPL-CCNC: 21 U/L
ANION GAP SERPL CALC-SCNC: 12 MMOL/L
AST SERPL-CCNC: 16 U/L
BASOPHILS # BLD AUTO: 0.07 K/UL
BASOPHILS NFR BLD AUTO: 0.8 %
BILIRUB SERPL-MCNC: 0.3 MG/DL
BUN SERPL-MCNC: 25 MG/DL
CALCIUM SERPL-MCNC: 9.6 MG/DL
CHLORIDE SERPL-SCNC: 102 MMOL/L
CHOLEST SERPL-MCNC: 121 MG/DL
CO2 SERPL-SCNC: 27 MMOL/L
CREAT SERPL-MCNC: 1.19 MG/DL
EOSINOPHIL # BLD AUTO: 0.35 K/UL
EOSINOPHIL NFR BLD AUTO: 4.1 %
ESTIMATED AVERAGE GLUCOSE: 148 MG/DL
GLUCOSE SERPL-MCNC: 123 MG/DL
HBA1C MFR BLD HPLC: 6.8 %
HCT VFR BLD CALC: 47 %
HDLC SERPL-MCNC: 32 MG/DL
HGB BLD-MCNC: 14.2 G/DL
IMM GRANULOCYTES NFR BLD AUTO: 0.7 %
LDLC SERPL CALC-MCNC: 27 MG/DL
LYMPHOCYTES # BLD AUTO: 1.51 K/UL
LYMPHOCYTES NFR BLD AUTO: 17.8 %
MAN DIFF?: NORMAL
MCHC RBC-ENTMCNC: 26.2 PG
MCHC RBC-ENTMCNC: 30.2 GM/DL
MCV RBC AUTO: 86.9 FL
MONOCYTES # BLD AUTO: 0.63 K/UL
MONOCYTES NFR BLD AUTO: 7.4 %
NEUTROPHILS # BLD AUTO: 5.86 K/UL
NEUTROPHILS NFR BLD AUTO: 69.2 %
NONHDLC SERPL-MCNC: 89 MG/DL
PLATELET # BLD AUTO: 279 K/UL
POTASSIUM SERPL-SCNC: 4.5 MMOL/L
PROT SERPL-MCNC: 7 G/DL
RBC # BLD: 5.41 M/UL
RBC # FLD: 15.6 %
SODIUM SERPL-SCNC: 141 MMOL/L
TRIGL SERPL-MCNC: 308 MG/DL
WBC # FLD AUTO: 8.48 K/UL

## 2021-04-20 ENCOUNTER — RX RENEWAL (OUTPATIENT)
Age: 81
End: 2021-04-20

## 2021-04-20 RX ORDER — METFORMIN HYDROCHLORIDE 500 MG/1
500 TABLET, FILM COATED, EXTENDED RELEASE ORAL
Qty: 90 | Refills: 1 | Status: ACTIVE | COMMUNITY
Start: 2019-11-04 | End: 1900-01-01

## 2021-04-20 RX ORDER — RANITIDINE 150 MG/1
150 TABLET ORAL
Qty: 90 | Refills: 1 | Status: COMPLETED | COMMUNITY
End: 2021-04-20

## 2021-05-03 ENCOUNTER — NON-APPOINTMENT (OUTPATIENT)
Age: 81
End: 2021-05-03

## 2021-05-05 ENCOUNTER — NON-APPOINTMENT (OUTPATIENT)
Age: 81
End: 2021-05-05

## 2021-05-10 ENCOUNTER — APPOINTMENT (OUTPATIENT)
Dept: PULMONOLOGY | Facility: CLINIC | Age: 81
End: 2021-05-10

## 2021-05-27 ENCOUNTER — NON-APPOINTMENT (OUTPATIENT)
Age: 81
End: 2021-05-27

## 2021-06-22 ENCOUNTER — RX RENEWAL (OUTPATIENT)
Age: 81
End: 2021-06-22

## 2021-07-06 ENCOUNTER — RX RENEWAL (OUTPATIENT)
Age: 81
End: 2021-07-06

## 2021-07-06 ENCOUNTER — NON-APPOINTMENT (OUTPATIENT)
Age: 81
End: 2021-07-06

## 2021-07-19 ENCOUNTER — RX RENEWAL (OUTPATIENT)
Age: 81
End: 2021-07-19

## 2021-07-26 ENCOUNTER — APPOINTMENT (OUTPATIENT)
Dept: FAMILY MEDICINE | Facility: CLINIC | Age: 81
End: 2021-07-26
Payer: MEDICARE

## 2021-08-03 ENCOUNTER — APPOINTMENT (OUTPATIENT)
Dept: FAMILY MEDICINE | Facility: CLINIC | Age: 81
End: 2021-08-03
Payer: MEDICARE

## 2021-08-03 VITALS
HEART RATE: 69 BPM | SYSTOLIC BLOOD PRESSURE: 128 MMHG | HEIGHT: 62 IN | WEIGHT: 219 LBS | OXYGEN SATURATION: 94 % | BODY MASS INDEX: 40.3 KG/M2 | DIASTOLIC BLOOD PRESSURE: 70 MMHG | TEMPERATURE: 97.6 F

## 2021-08-03 DIAGNOSIS — R06.00 DYSPNEA, UNSPECIFIED: ICD-10-CM

## 2021-08-03 DIAGNOSIS — R53.83 OTHER FATIGUE: ICD-10-CM

## 2021-08-03 PROCEDURE — 99214 OFFICE O/P EST MOD 30 MIN: CPT | Mod: 25

## 2021-08-03 PROCEDURE — 36415 COLL VENOUS BLD VENIPUNCTURE: CPT

## 2021-08-04 ENCOUNTER — RX RENEWAL (OUTPATIENT)
Age: 81
End: 2021-08-04

## 2021-08-05 NOTE — PHYSICAL EXAM
[No JVD] : no jugular venous distention [No Lymphadenopathy] : no lymphadenopathy [No Respiratory Distress] : no respiratory distress  [No Accessory Muscle Use] : no accessory muscle use [Normal Rate] : normal rate  [Regular Rhythm] : with a regular rhythm [Normal S1, S2] : normal S1 and S2 [No Murmur] : no murmur heard [No Carotid Bruits] : no carotid bruits [No Varicosities] : no varicosities [Pedal Pulses Present] : the pedal pulses are present [Normal] : affect was normal and insight and judgment were intact [de-identified] : obese [de-identified] : Decreased BS bilat [de-identified] : 1+ edema ankles [de-identified] : obese

## 2021-08-05 NOTE — HISTORY OF PRESENT ILLNESS
[FreeTextEntry1] : patient here for follow up [de-identified] : Pt hx reviewed. LE edema for years\par Main complaint is labored breathing with activity , feels its secondary to his wt but never had cardia work up. Has seen pulmonary for copd\par Follow by DM ed and management\par Claims symbicort is not working, uses ventolin prn

## 2021-08-12 LAB
ALBUMIN SERPL ELPH-MCNC: 4.3 G/DL
ALP BLD-CCNC: 87 U/L
ALT SERPL-CCNC: 19 U/L
ANION GAP SERPL CALC-SCNC: 14 MMOL/L
AST SERPL-CCNC: 16 U/L
BASOPHILS # BLD AUTO: 0.06 K/UL
BASOPHILS NFR BLD AUTO: 0.8 %
BILIRUB SERPL-MCNC: 0.3 MG/DL
BUN SERPL-MCNC: 23 MG/DL
CALCIUM SERPL-MCNC: 9.3 MG/DL
CHLORIDE SERPL-SCNC: 103 MMOL/L
CHOLEST SERPL-MCNC: 120 MG/DL
CO2 SERPL-SCNC: 23 MMOL/L
CREAT SERPL-MCNC: 0.99 MG/DL
EOSINOPHIL # BLD AUTO: 0.21 K/UL
EOSINOPHIL NFR BLD AUTO: 2.8 %
ESTIMATED AVERAGE GLUCOSE: 140 MG/DL
GLUCOSE SERPL-MCNC: 128 MG/DL
HBA1C MFR BLD HPLC: 6.5 %
HCT VFR BLD CALC: 47.9 %
HDLC SERPL-MCNC: 36 MG/DL
HGB BLD-MCNC: 14.2 G/DL
IMM GRANULOCYTES NFR BLD AUTO: 0.4 %
LDLC SERPL CALC-MCNC: 48 MG/DL
LYMPHOCYTES # BLD AUTO: 1.37 K/UL
LYMPHOCYTES NFR BLD AUTO: 18.5 %
MAN DIFF?: NORMAL
MCHC RBC-ENTMCNC: 26.8 PG
MCHC RBC-ENTMCNC: 29.6 GM/DL
MCV RBC AUTO: 90.5 FL
MONOCYTES # BLD AUTO: 0.51 K/UL
MONOCYTES NFR BLD AUTO: 6.9 %
NEUTROPHILS # BLD AUTO: 5.24 K/UL
NEUTROPHILS NFR BLD AUTO: 70.6 %
NONHDLC SERPL-MCNC: 84 MG/DL
PLATELET # BLD AUTO: 261 K/UL
POTASSIUM SERPL-SCNC: 4.6 MMOL/L
PROT SERPL-MCNC: 6.9 G/DL
PSA SERPL-MCNC: 0.02 NG/ML
RBC # BLD: 5.29 M/UL
RBC # FLD: 16.1 %
SODIUM SERPL-SCNC: 140 MMOL/L
TRIGL SERPL-MCNC: 179 MG/DL
WBC # FLD AUTO: 7.42 K/UL

## 2021-09-02 ENCOUNTER — NON-APPOINTMENT (OUTPATIENT)
Age: 81
End: 2021-09-02

## 2021-09-02 VITALS — WEIGHT: 219 LBS | BODY MASS INDEX: 40.3 KG/M2 | HEIGHT: 62 IN

## 2021-09-07 ENCOUNTER — RX RENEWAL (OUTPATIENT)
Age: 81
End: 2021-09-07

## 2021-09-30 ENCOUNTER — APPOINTMENT (OUTPATIENT)
Dept: UROLOGY | Facility: CLINIC | Age: 81
End: 2021-09-30

## 2021-10-07 ENCOUNTER — RX RENEWAL (OUTPATIENT)
Age: 81
End: 2021-10-07

## 2021-10-19 ENCOUNTER — APPOINTMENT (OUTPATIENT)
Dept: UROLOGY | Facility: CLINIC | Age: 81
End: 2021-10-19

## 2021-10-25 ENCOUNTER — RX RENEWAL (OUTPATIENT)
Age: 81
End: 2021-10-25

## 2021-10-28 ENCOUNTER — RX RENEWAL (OUTPATIENT)
Age: 81
End: 2021-10-28

## 2021-11-12 ENCOUNTER — APPOINTMENT (OUTPATIENT)
Dept: FAMILY MEDICINE | Facility: CLINIC | Age: 81
End: 2021-11-12
Payer: MEDICARE

## 2021-11-12 VITALS
DIASTOLIC BLOOD PRESSURE: 80 MMHG | WEIGHT: 219 LBS | HEART RATE: 74 BPM | TEMPERATURE: 97.1 F | BODY MASS INDEX: 40.3 KG/M2 | HEIGHT: 62 IN | SYSTOLIC BLOOD PRESSURE: 122 MMHG | OXYGEN SATURATION: 94 %

## 2021-11-12 DIAGNOSIS — E66.01 MORBID (SEVERE) OBESITY DUE TO EXCESS CALORIES: ICD-10-CM

## 2021-11-12 DIAGNOSIS — G62.9 POLYNEUROPATHY, UNSPECIFIED: ICD-10-CM

## 2021-11-12 PROCEDURE — 99214 OFFICE O/P EST MOD 30 MIN: CPT

## 2021-11-12 NOTE — HISTORY OF PRESENT ILLNESS
[FreeTextEntry1] : pt is here for f/u \par legs are doing worse [de-identified] : Pt hx reviewed. LE edema for years\par Main complaint is labored breathing with activity , feels its secondary to his wt but never had cardia work up. Has seen pulmonary for copd\par Follow by DM ed and management\par Claims symbicort is not working, uses ventolin prn

## 2021-11-12 NOTE — PHYSICAL EXAM
[No JVD] : no jugular venous distention [No Lymphadenopathy] : no lymphadenopathy [No Respiratory Distress] : no respiratory distress  [No Accessory Muscle Use] : no accessory muscle use [Normal Rate] : normal rate  [Regular Rhythm] : with a regular rhythm [Normal S1, S2] : normal S1 and S2 [No Murmur] : no murmur heard [No Carotid Bruits] : no carotid bruits [No Varicosities] : no varicosities [Pedal Pulses Present] : the pedal pulses are present [Normal] : affect was normal and insight and judgment were intact [de-identified] : obese [de-identified] : Decreased BS bilat [de-identified] : 1+ edema ankles [de-identified] : obese

## 2021-11-12 NOTE — REVIEW OF SYSTEMS
[Fatigue] : fatigue [Lower Ext Edema] : lower extremity edema [Shortness Of Breath] : shortness of breath [Cough] : cough [Dyspnea on Exertion] : dyspnea on exertion [Joint Pain] : joint pain [Joint Stiffness] : joint stiffness [Muscle Pain] : muscle pain [Back Pain] : back pain [Muscle Weakness] : muscle weakness [Unsteady Walk] : ataxia [Negative] : Psychiatric [Fever] : no fever [Chills] : no chills [Hot Flashes] : no hot flashes [Night Sweats] : no night sweats [Recent Change In Weight] : ~T no recent weight change [Chest Pain] : no chest pain [Palpitations] : no palpitations [Claudication] : no  leg claudication [Orthopena] : no orthopnea [Paroxysmal Nocturnal Dyspnea] : no paroxysmal nocturnal dyspnea [Wheezing] : no wheezing

## 2021-12-02 ENCOUNTER — RX RENEWAL (OUTPATIENT)
Age: 81
End: 2021-12-02

## 2021-12-17 ENCOUNTER — APPOINTMENT (OUTPATIENT)
Dept: FAMILY MEDICINE | Facility: CLINIC | Age: 81
End: 2021-12-17
Payer: MEDICARE

## 2021-12-17 VITALS
SYSTOLIC BLOOD PRESSURE: 140 MMHG | TEMPERATURE: 97.7 F | BODY MASS INDEX: 40.06 KG/M2 | DIASTOLIC BLOOD PRESSURE: 66 MMHG | OXYGEN SATURATION: 91 % | HEART RATE: 76 BPM | WEIGHT: 219 LBS

## 2021-12-17 DIAGNOSIS — Z96.641 PRESENCE OF RIGHT ARTIFICIAL HIP JOINT: ICD-10-CM

## 2021-12-17 DIAGNOSIS — M25.551 PAIN IN RIGHT HIP: ICD-10-CM

## 2021-12-17 DIAGNOSIS — Z98.1 ARTHRODESIS STATUS: ICD-10-CM

## 2021-12-17 PROCEDURE — 99214 OFFICE O/P EST MOD 30 MIN: CPT

## 2021-12-17 RX ORDER — TRAMADOL HYDROCHLORIDE AND ACETAMINOPHEN 37.5; 325 MG/1; MG/1
37.5-325 TABLET, FILM COATED ORAL
Qty: 20 | Refills: 0 | Status: ACTIVE | COMMUNITY
Start: 2021-12-17 | End: 1900-01-01

## 2021-12-19 NOTE — PHYSICAL EXAM
[No JVD] : no jugular venous distention [No Lymphadenopathy] : no lymphadenopathy [No Respiratory Distress] : no respiratory distress  [No Accessory Muscle Use] : no accessory muscle use [Normal Rate] : normal rate  [Regular Rhythm] : with a regular rhythm [Normal S1, S2] : normal S1 and S2 [No Murmur] : no murmur heard [No Carotid Bruits] : no carotid bruits [No Varicosities] : no varicosities [Pedal Pulses Present] : the pedal pulses are present [Normal] : affect was normal and insight and judgment were intact [de-identified] : obese [de-identified] : Decreased BS bilat [de-identified] : 1+ edema ankles [de-identified] : obese

## 2021-12-19 NOTE — PLAN
[FreeTextEntry1] : Pt requesting pain meds till he sees ortho next week.\par Rx written for stand up walker

## 2021-12-19 NOTE — HISTORY OF PRESENT ILLNESS
[FreeTextEntry1] : patient here for a follow up\par C/o severe rt hip pain where he has had TKR\par Seeing [facility] next week [de-identified] : Pt hx reviewed. LE edema for years\par Main complaint is labored breathing with activity , feels its secondary to his wt but never had cardia work up. Has seen pulmonary for copd\par Follow by DM ed and management\par Claims symbicort is not working, uses ventolin prn

## 2021-12-19 NOTE — REVIEW OF SYSTEMS
[Fever] : no fever [Chills] : no chills [Fatigue] : fatigue [Hot Flashes] : no hot flashes [Night Sweats] : no night sweats [Recent Change In Weight] : ~T no recent weight change [Chest Pain] : no chest pain [Palpitations] : no palpitations [Claudication] : no  leg claudication [Lower Ext Edema] : lower extremity edema [Orthopena] : no orthopnea [Paroxysmal Nocturnal Dyspnea] : no paroxysmal nocturnal dyspnea [Shortness Of Breath] : shortness of breath [Wheezing] : no wheezing [Cough] : cough [Dyspnea on Exertion] : dyspnea on exertion [Joint Pain] : joint pain [Joint Stiffness] : joint stiffness [Muscle Pain] : muscle pain [Muscle Weakness] : muscle weakness [Back Pain] : back pain [Unsteady Walk] : ataxia [Negative] : Psychiatric [FreeTextEntry9] : as per cc

## 2022-01-12 ENCOUNTER — NON-APPOINTMENT (OUTPATIENT)
Age: 82
End: 2022-01-12

## 2022-01-14 ENCOUNTER — APPOINTMENT (OUTPATIENT)
Dept: FAMILY MEDICINE | Facility: CLINIC | Age: 82
End: 2022-01-14

## 2022-04-03 ENCOUNTER — RX RENEWAL (OUTPATIENT)
Age: 82
End: 2022-04-03

## 2022-04-07 ENCOUNTER — TRANSCRIPTION ENCOUNTER (OUTPATIENT)
Age: 82
End: 2022-04-07

## 2022-04-08 ENCOUNTER — TRANSCRIPTION ENCOUNTER (OUTPATIENT)
Age: 82
End: 2022-04-08

## 2022-04-22 ENCOUNTER — RX RENEWAL (OUTPATIENT)
Age: 82
End: 2022-04-22

## 2022-04-22 RX ORDER — ROPINIROLE 2 MG/1
2 TABLET, FILM COATED ORAL TWICE DAILY
Qty: 180 | Refills: 2 | Status: ACTIVE | COMMUNITY
Start: 2020-11-05 | End: 1900-01-01

## 2022-04-22 RX ORDER — FLUTICASONE FUROATE AND VILANTEROL TRIFENATATE 100; 25 UG/1; UG/1
100-25 POWDER RESPIRATORY (INHALATION)
Qty: 60 | Refills: 2 | Status: ACTIVE | COMMUNITY
Start: 2020-05-04 | End: 1900-01-01

## 2022-04-22 RX ORDER — ATORVASTATIN CALCIUM 10 MG/1
10 TABLET, FILM COATED ORAL DAILY
Qty: 90 | Refills: 2 | Status: ACTIVE | COMMUNITY
Start: 2019-11-04 | End: 1900-01-01

## 2022-04-22 RX ORDER — METFORMIN ER 500 MG 500 MG/1
500 TABLET ORAL
Qty: 90 | Refills: 2 | Status: ACTIVE | COMMUNITY
Start: 2020-07-28 | End: 1900-01-01

## 2022-04-22 RX ORDER — PANTOPRAZOLE 40 MG/1
40 TABLET, DELAYED RELEASE ORAL
Qty: 180 | Refills: 2 | Status: ACTIVE | COMMUNITY
Start: 2021-12-02 | End: 1900-01-01

## 2022-04-26 ENCOUNTER — APPOINTMENT (OUTPATIENT)
Dept: FAMILY MEDICINE | Facility: CLINIC | Age: 82
End: 2022-04-26
Payer: MEDICARE

## 2022-04-26 VITALS
BODY MASS INDEX: 37.5 KG/M2 | SYSTOLIC BLOOD PRESSURE: 130 MMHG | WEIGHT: 205 LBS | OXYGEN SATURATION: 93 % | DIASTOLIC BLOOD PRESSURE: 60 MMHG | HEART RATE: 49 BPM | TEMPERATURE: 97.3 F

## 2022-04-26 DIAGNOSIS — J44.9 CHRONIC OBSTRUCTIVE PULMONARY DISEASE, UNSPECIFIED: ICD-10-CM

## 2022-04-26 DIAGNOSIS — C67.9 MALIGNANT NEOPLASM OF BLADDER, UNSPECIFIED: ICD-10-CM

## 2022-04-26 DIAGNOSIS — R05.3 CHRONIC COUGH: ICD-10-CM

## 2022-04-26 PROCEDURE — 36415 COLL VENOUS BLD VENIPUNCTURE: CPT

## 2022-04-26 PROCEDURE — 99214 OFFICE O/P EST MOD 30 MIN: CPT | Mod: 25

## 2022-04-28 NOTE — HISTORY OF PRESENT ILLNESS
[FreeTextEntry8] : patient here with dry cough, dizziness when bend over,chest hurt when swallow food,also patient has a continence urinefor about 2 months

## 2022-04-29 LAB
ALBUMIN SERPL ELPH-MCNC: 4.3 G/DL
ALP BLD-CCNC: 101 U/L
ALT SERPL-CCNC: 11 U/L
ANION GAP SERPL CALC-SCNC: 12 MMOL/L
AST SERPL-CCNC: 12 U/L
BASOPHILS # BLD AUTO: 0.1 K/UL
BASOPHILS NFR BLD AUTO: 1.3 %
BILIRUB SERPL-MCNC: 0.2 MG/DL
BUN SERPL-MCNC: 20 MG/DL
CALCIUM SERPL-MCNC: 9.3 MG/DL
CHLORIDE SERPL-SCNC: 103 MMOL/L
CHOLEST SERPL-MCNC: 162 MG/DL
CO2 SERPL-SCNC: 27 MMOL/L
CREAT SERPL-MCNC: 0.86 MG/DL
EGFR: 88 ML/MIN/1.73M2
EOSINOPHIL # BLD AUTO: 0.28 K/UL
EOSINOPHIL NFR BLD AUTO: 3.7 %
ESTIMATED AVERAGE GLUCOSE: 134 MG/DL
GLUCOSE SERPL-MCNC: 130 MG/DL
HBA1C MFR BLD HPLC: 6.3 %
HCT VFR BLD CALC: 43.1 %
HDLC SERPL-MCNC: 33 MG/DL
HGB BLD-MCNC: 12.5 G/DL
IMM GRANULOCYTES NFR BLD AUTO: 0.8 %
LDLC SERPL CALC-MCNC: 87 MG/DL
LYMPHOCYTES # BLD AUTO: 1.43 K/UL
LYMPHOCYTES NFR BLD AUTO: 19.1 %
MAN DIFF?: NORMAL
MCHC RBC-ENTMCNC: 24.9 PG
MCHC RBC-ENTMCNC: 29 GM/DL
MCV RBC AUTO: 85.9 FL
MONOCYTES # BLD AUTO: 0.69 K/UL
MONOCYTES NFR BLD AUTO: 9.2 %
NEUTROPHILS # BLD AUTO: 4.92 K/UL
NEUTROPHILS NFR BLD AUTO: 65.9 %
NONHDLC SERPL-MCNC: 130 MG/DL
PLATELET # BLD AUTO: 363 K/UL
POTASSIUM SERPL-SCNC: 4.2 MMOL/L
PROT SERPL-MCNC: 7 G/DL
PSA SERPL-MCNC: 0.02 NG/ML
RBC # BLD: 5.02 M/UL
RBC # FLD: 15.3 %
SODIUM SERPL-SCNC: 142 MMOL/L
TRIGL SERPL-MCNC: 212 MG/DL
WBC # FLD AUTO: 7.48 K/UL

## 2022-05-02 DIAGNOSIS — R13.10 DYSPHAGIA, UNSPECIFIED: ICD-10-CM

## 2022-05-16 ENCOUNTER — APPOINTMENT (OUTPATIENT)
Dept: ORTHOPEDIC SURGERY | Facility: CLINIC | Age: 82
End: 2022-05-16
Payer: MEDICARE

## 2022-05-16 VITALS — HEIGHT: 62 IN | WEIGHT: 205 LBS | BODY MASS INDEX: 37.73 KG/M2

## 2022-05-16 DIAGNOSIS — S82.201D UNSPECIFIED FRACTURE OF SHAFT OF RIGHT TIBIA, SUBSEQUENT ENCOUNTER FOR CLOSED FRACTURE WITH ROUTINE HEALING: ICD-10-CM

## 2022-05-16 PROCEDURE — 73590 X-RAY EXAM OF LOWER LEG: CPT | Mod: RT

## 2022-05-16 PROCEDURE — 99213 OFFICE O/P EST LOW 20 MIN: CPT

## 2022-05-16 NOTE — DISCUSSION/SUMMARY
[de-identified] : The patient was advised of the diagnosis.  The natural history of the pathology was explained in full to the patient in layman's terms. All questions were answered.  The risks and benefits of surgical and non-surgical treatment alternatives were explained in full to the patient.\par

## 2022-05-16 NOTE — HISTORY OF PRESENT ILLNESS
[0] : 0 [Retired] : Work status: retired [de-identified] : 5/16/22: No pain, completed PT, stopped cam boot about 1 month ago.\par \par Prev doc:\par 2/7/22: Patient fell in the bathroom of [employer] 5 months ago. He had an ORIF for distal third of the tibia and fibula. He has not been seen by surgeon since. Using wheelchair\par 3/7/22: Doing well 3 months s/p right tib/fib fx. Is still in rehab doing touch toe weight bearing [FreeTextEntry1] : right tib fib  [de-identified] : None

## 2022-05-16 NOTE — ASSESSMENT
[FreeTextEntry1] : Previous doc:\par 2/7/22: S/P tib fib fx - Healing well from ORIF - Sutures were removed today and he was placed in a CAM boot that can be removed when sleeping. Begin touch toe weight bearing.\par 3/7/22: fx healing well with all components in place - Will start weight bearing as tolerated in boot and F/U in 4 weeks w/ repeat XR- Will remain out of boot for resting, sleeping and work on ankle ROM\par \par 5/16/22: Fx healed.  Cont HEP, still uses walker.  Caution advised with return to driving given extensive injury and his chronic weakness.  Return prn.

## 2022-05-16 NOTE — IMAGING
[de-identified] : RIght ankle: Inc healed.  No calf tendnerness.  No tenderness around ankle.  Uses walker.\par \par XR right tib/fib: Fx healed.  Hardware in good position.

## 2022-06-12 NOTE — PHYSICAL EXAM
[Normal Sclera/Conjunctiva] : normal sclera/conjunctiva [Normal Outer Ear/Nose] : the outer ears and nose were normal in appearance [Non-distended] : non-distended [Normal] : affect was normal and insight and judgment were intact [de-identified] : ambulate with cane [de-identified] : 1+ pitting edema right LE, negative homans test Home

## 2022-07-07 ENCOUNTER — NON-APPOINTMENT (OUTPATIENT)
Age: 82
End: 2022-07-07

## 2022-08-02 ENCOUNTER — RX RENEWAL (OUTPATIENT)
Age: 82
End: 2022-08-02

## 2022-08-02 RX ORDER — AMLODIPINE BESYLATE 5 MG/1
5 TABLET ORAL
Qty: 90 | Refills: 0 | Status: ACTIVE | COMMUNITY
Start: 2019-04-02 | End: 1900-01-01

## 2022-08-04 ENCOUNTER — NON-APPOINTMENT (OUTPATIENT)
Age: 82
End: 2022-08-04

## 2022-08-09 ENCOUNTER — APPOINTMENT (OUTPATIENT)
Dept: FAMILY MEDICINE | Facility: CLINIC | Age: 82
End: 2022-08-09

## 2022-08-09 VITALS
OXYGEN SATURATION: 91 % | BODY MASS INDEX: 37.5 KG/M2 | WEIGHT: 205 LBS | HEART RATE: 74 BPM | DIASTOLIC BLOOD PRESSURE: 80 MMHG | SYSTOLIC BLOOD PRESSURE: 132 MMHG | TEMPERATURE: 97.6 F

## 2022-08-09 DIAGNOSIS — I10 ESSENTIAL (PRIMARY) HYPERTENSION: ICD-10-CM

## 2022-08-09 DIAGNOSIS — R60.0 LOCALIZED EDEMA: ICD-10-CM

## 2022-08-09 DIAGNOSIS — E11.9 TYPE 2 DIABETES MELLITUS W/OUT COMPLICATIONS: ICD-10-CM

## 2022-08-09 DIAGNOSIS — K21.9 GASTRO-ESOPHAGEAL REFLUX DISEASE W/OUT ESOPHAGITIS: ICD-10-CM

## 2022-08-09 PROCEDURE — 99214 OFFICE O/P EST MOD 30 MIN: CPT

## 2022-08-09 RX ORDER — PREGABALIN 300 MG/1
300 CAPSULE ORAL TWICE DAILY
Qty: 60 | Refills: 3 | Status: ACTIVE | COMMUNITY
Start: 2021-05-27 | End: 1900-01-01

## 2022-08-09 RX ORDER — TOLTERODINE TARTRATE 2 MG/1
2 CAPSULE, EXTENDED RELEASE ORAL
Qty: 90 | Refills: 1 | Status: DISCONTINUED | COMMUNITY
End: 2022-08-09

## 2022-08-11 PROBLEM — E11.9 DIABETES MELLITUS: Status: ACTIVE | Noted: 2019-11-04

## 2022-08-11 PROBLEM — K21.9 CHRONIC GERD: Status: ACTIVE | Noted: 2019-06-17

## 2022-08-11 PROBLEM — I10 BENIGN ESSENTIAL HYPERTENSION: Status: ACTIVE | Noted: 2019-06-17

## 2022-08-11 PROBLEM — R60.0 BILATERAL EDEMA OF LOWER EXTREMITY: Status: ACTIVE | Noted: 2020-08-03

## 2022-08-11 NOTE — HISTORY OF PRESENT ILLNESS
[FreeTextEntry1] : patient here for a follow up\par \par  [de-identified] : Pt hx reviewed. LE edema for years

## 2022-08-11 NOTE — PHYSICAL EXAM
[No JVD] : no jugular venous distention [No Lymphadenopathy] : no lymphadenopathy [No Respiratory Distress] : no respiratory distress  [No Accessory Muscle Use] : no accessory muscle use [Normal Rate] : normal rate  [Regular Rhythm] : with a regular rhythm [Normal S1, S2] : normal S1 and S2 [No Murmur] : no murmur heard [No Carotid Bruits] : no carotid bruits [No Varicosities] : no varicosities [Pedal Pulses Present] : the pedal pulses are present [Normal] : affect was normal and insight and judgment were intact [de-identified] : obese [de-identified] : Decreased BS bilat [de-identified] : 1+ edema ankles [de-identified] : obese

## 2022-08-11 NOTE — REVIEW OF SYSTEMS
[Fatigue] : fatigue [Lower Ext Edema] : lower extremity edema [Shortness Of Breath] : shortness of breath [Cough] : cough [Dyspnea on Exertion] : dyspnea on exertion [Joint Pain] : joint pain [Joint Stiffness] : joint stiffness [Muscle Pain] : muscle pain [Muscle Weakness] : muscle weakness [Back Pain] : back pain [Unsteady Walk] : ataxia [Negative] : Psychiatric [Fever] : no fever [Chills] : no chills [Hot Flashes] : no hot flashes [Night Sweats] : no night sweats [Recent Change In Weight] : ~T no recent weight change [Chest Pain] : no chest pain [Palpitations] : no palpitations [Claudication] : no  leg claudication [Orthopena] : no orthopnea [Paroxysmal Nocturnal Dyspnea] : no paroxysmal nocturnal dyspnea [Wheezing] : no wheezing [FreeTextEntry9] : as per cc

## 2022-10-17 ENCOUNTER — NON-APPOINTMENT (OUTPATIENT)
Age: 82
End: 2022-10-17

## 2022-12-22 NOTE — BRIEF OPERATIVE NOTE - NSICDXBRIEFPREOP_GEN_ALL_CORE_FT
PRE-OP DIAGNOSIS:  Primary osteoarthritis of right hip 26-Jun-2019 08:47:41  Gt Ga The patient has been re-examined and I agree with the above assessment or I updated with my findings.